# Patient Record
Sex: FEMALE | Race: WHITE | Employment: OTHER | URBAN - METROPOLITAN AREA
[De-identification: names, ages, dates, MRNs, and addresses within clinical notes are randomized per-mention and may not be internally consistent; named-entity substitution may affect disease eponyms.]

---

## 2017-01-26 ENCOUNTER — OFFICE VISIT (OUTPATIENT)
Dept: CARDIOLOGY CLINIC | Age: 77
End: 2017-01-26

## 2017-01-26 VITALS
BODY MASS INDEX: 23.84 KG/M2 | HEIGHT: 60 IN | DIASTOLIC BLOOD PRESSURE: 80 MMHG | HEART RATE: 62 BPM | SYSTOLIC BLOOD PRESSURE: 132 MMHG | WEIGHT: 121.4 LBS

## 2017-01-26 DIAGNOSIS — K21.9 GASTROESOPHAGEAL REFLUX DISEASE WITHOUT ESOPHAGITIS: ICD-10-CM

## 2017-01-26 DIAGNOSIS — R55 SYNCOPE, UNSPECIFIED SYNCOPE TYPE: Primary | ICD-10-CM

## 2017-01-26 DIAGNOSIS — K44.9 HERNIA, HIATAL: ICD-10-CM

## 2017-01-26 DIAGNOSIS — I44.7 LBBB (LEFT BUNDLE BRANCH BLOCK): ICD-10-CM

## 2017-01-26 DIAGNOSIS — Z82.49 FAMILY HISTORY OF CORONARY ARTERY DISEASE: ICD-10-CM

## 2017-01-26 DIAGNOSIS — I10 ESSENTIAL HYPERTENSION: ICD-10-CM

## 2017-01-26 DIAGNOSIS — I38 VHD (VALVULAR HEART DISEASE): ICD-10-CM

## 2017-01-26 PROCEDURE — G8400 PT W/DXA NO RESULTS DOC: HCPCS | Performed by: INTERNAL MEDICINE

## 2017-01-26 PROCEDURE — 1036F TOBACCO NON-USER: CPT | Performed by: INTERNAL MEDICINE

## 2017-01-26 PROCEDURE — 99214 OFFICE O/P EST MOD 30 MIN: CPT | Performed by: INTERNAL MEDICINE

## 2017-01-26 PROCEDURE — G8484 FLU IMMUNIZE NO ADMIN: HCPCS | Performed by: INTERNAL MEDICINE

## 2017-01-26 PROCEDURE — 1090F PRES/ABSN URINE INCON ASSESS: CPT | Performed by: INTERNAL MEDICINE

## 2017-01-26 PROCEDURE — 4040F PNEUMOC VAC/ADMIN/RCVD: CPT | Performed by: INTERNAL MEDICINE

## 2017-01-26 PROCEDURE — G8420 CALC BMI NORM PARAMETERS: HCPCS | Performed by: INTERNAL MEDICINE

## 2017-01-26 PROCEDURE — G8427 DOCREV CUR MEDS BY ELIG CLIN: HCPCS | Performed by: INTERNAL MEDICINE

## 2017-01-26 PROCEDURE — 1123F ACP DISCUSS/DSCN MKR DOCD: CPT | Performed by: INTERNAL MEDICINE

## 2017-02-09 ENCOUNTER — NURSE ONLY (OUTPATIENT)
Dept: CARDIOLOGY CLINIC | Age: 77
End: 2017-02-09

## 2017-02-09 DIAGNOSIS — K44.9 HERNIA, HIATAL: ICD-10-CM

## 2017-02-09 DIAGNOSIS — R55 SYNCOPE, UNSPECIFIED SYNCOPE TYPE: Primary | ICD-10-CM

## 2017-02-09 DIAGNOSIS — I38 VHD (VALVULAR HEART DISEASE): ICD-10-CM

## 2017-02-09 DIAGNOSIS — I44.7 LBBB (LEFT BUNDLE BRANCH BLOCK): ICD-10-CM

## 2017-02-09 DIAGNOSIS — K21.9 GASTROESOPHAGEAL REFLUX DISEASE WITHOUT ESOPHAGITIS: ICD-10-CM

## 2017-02-09 DIAGNOSIS — I10 ESSENTIAL HYPERTENSION: ICD-10-CM

## 2017-02-09 DIAGNOSIS — Z82.49 FAMILY HISTORY OF CORONARY ARTERY DISEASE: ICD-10-CM

## 2017-02-09 PROCEDURE — 93225 XTRNL ECG REC<48 HRS REC: CPT | Performed by: INTERNAL MEDICINE

## 2017-02-22 PROCEDURE — 93227 XTRNL ECG REC<48 HR R&I: CPT | Performed by: INTERNAL MEDICINE

## 2017-02-23 ENCOUNTER — OFFICE VISIT (OUTPATIENT)
Dept: CARDIOLOGY CLINIC | Age: 77
End: 2017-02-23

## 2017-02-23 VITALS
OXYGEN SATURATION: 95 % | SYSTOLIC BLOOD PRESSURE: 140 MMHG | BODY MASS INDEX: 23.95 KG/M2 | HEART RATE: 58 BPM | WEIGHT: 122 LBS | DIASTOLIC BLOOD PRESSURE: 60 MMHG | HEIGHT: 60 IN

## 2017-02-23 DIAGNOSIS — K21.9 GASTROESOPHAGEAL REFLUX DISEASE WITHOUT ESOPHAGITIS: ICD-10-CM

## 2017-02-23 DIAGNOSIS — Z82.49 FAMILY HISTORY OF CORONARY ARTERY DISEASE: ICD-10-CM

## 2017-02-23 DIAGNOSIS — I44.7 LBBB (LEFT BUNDLE BRANCH BLOCK): ICD-10-CM

## 2017-02-23 DIAGNOSIS — I10 ESSENTIAL HYPERTENSION: Primary | ICD-10-CM

## 2017-02-23 DIAGNOSIS — I38 VHD (VALVULAR HEART DISEASE): ICD-10-CM

## 2017-02-23 DIAGNOSIS — R55 SYNCOPE, UNSPECIFIED SYNCOPE TYPE: ICD-10-CM

## 2017-02-23 PROCEDURE — G8427 DOCREV CUR MEDS BY ELIG CLIN: HCPCS | Performed by: INTERNAL MEDICINE

## 2017-02-23 PROCEDURE — 99213 OFFICE O/P EST LOW 20 MIN: CPT | Performed by: INTERNAL MEDICINE

## 2017-02-23 PROCEDURE — G8400 PT W/DXA NO RESULTS DOC: HCPCS | Performed by: INTERNAL MEDICINE

## 2017-02-23 PROCEDURE — 4040F PNEUMOC VAC/ADMIN/RCVD: CPT | Performed by: INTERNAL MEDICINE

## 2017-02-23 PROCEDURE — 1123F ACP DISCUSS/DSCN MKR DOCD: CPT | Performed by: INTERNAL MEDICINE

## 2017-02-23 PROCEDURE — 1036F TOBACCO NON-USER: CPT | Performed by: INTERNAL MEDICINE

## 2017-02-23 PROCEDURE — G8420 CALC BMI NORM PARAMETERS: HCPCS | Performed by: INTERNAL MEDICINE

## 2017-02-23 PROCEDURE — G8484 FLU IMMUNIZE NO ADMIN: HCPCS | Performed by: INTERNAL MEDICINE

## 2017-02-23 PROCEDURE — 1090F PRES/ABSN URINE INCON ASSESS: CPT | Performed by: INTERNAL MEDICINE

## 2017-03-16 ENCOUNTER — HOSPITAL ENCOUNTER (OUTPATIENT)
Dept: GENERAL RADIOLOGY | Age: 77
Discharge: HOME | End: 2017-03-16
Attending: GENERAL PRACTICE | Admitting: GENERAL PRACTICE

## 2017-03-16 DIAGNOSIS — R05.9 COUGH: ICD-10-CM

## 2017-03-30 ENCOUNTER — HOSPITAL ENCOUNTER (OUTPATIENT)
Dept: INFUSION THERAPY | Age: 77
Discharge: HOME | End: 2017-03-30
Attending: GENERAL PRACTICE | Admitting: GENERAL PRACTICE

## 2017-03-30 VITALS
RESPIRATION RATE: 14 BRPM | HEART RATE: 58 BPM | OXYGEN SATURATION: 98 % | DIASTOLIC BLOOD PRESSURE: 66 MMHG | TEMPERATURE: 97.9 F | SYSTOLIC BLOOD PRESSURE: 135 MMHG

## 2017-04-10 ENCOUNTER — HOSPITAL ENCOUNTER (OUTPATIENT)
Dept: GENERAL RADIOLOGY | Age: 77
Discharge: HOME | End: 2017-04-10
Attending: GENERAL PRACTICE | Admitting: GENERAL PRACTICE

## 2017-04-10 DIAGNOSIS — J15.9 BACTERIAL PNEUMONIA: ICD-10-CM

## 2017-04-10 DIAGNOSIS — I10 BENIGN HYPERTENSION: ICD-10-CM

## 2017-06-16 ENCOUNTER — HOSPITAL ENCOUNTER (OUTPATIENT)
Dept: OTHER | Age: 77
Discharge: HOME | End: 2017-06-16
Attending: FAMILY MEDICINE

## 2017-06-18 LAB
CULTURE: NORMAL
REPORT STATUS: NORMAL
REQUEST PROBLEM: NORMAL
SPECIMEN: NORMAL

## 2017-09-28 ENCOUNTER — OFFICE VISIT (OUTPATIENT)
Dept: CARDIOLOGY CLINIC | Age: 77
End: 2017-09-28

## 2017-09-28 VITALS
DIASTOLIC BLOOD PRESSURE: 76 MMHG | WEIGHT: 122.6 LBS | HEART RATE: 62 BPM | HEIGHT: 60 IN | BODY MASS INDEX: 24.07 KG/M2 | SYSTOLIC BLOOD PRESSURE: 120 MMHG

## 2017-09-28 DIAGNOSIS — I38 VHD (VALVULAR HEART DISEASE): ICD-10-CM

## 2017-09-28 DIAGNOSIS — K44.9 HERNIA, HIATAL: ICD-10-CM

## 2017-09-28 DIAGNOSIS — I44.7 LBBB (LEFT BUNDLE BRANCH BLOCK): ICD-10-CM

## 2017-09-28 DIAGNOSIS — Z82.49 FAMILY HISTORY OF CORONARY ARTERY DISEASE: ICD-10-CM

## 2017-09-28 DIAGNOSIS — R55 SYNCOPE, UNSPECIFIED SYNCOPE TYPE: ICD-10-CM

## 2017-09-28 DIAGNOSIS — K21.9 GASTROESOPHAGEAL REFLUX DISEASE WITHOUT ESOPHAGITIS: ICD-10-CM

## 2017-09-28 DIAGNOSIS — I10 ESSENTIAL HYPERTENSION: Primary | ICD-10-CM

## 2017-09-28 PROCEDURE — 1123F ACP DISCUSS/DSCN MKR DOCD: CPT | Performed by: INTERNAL MEDICINE

## 2017-09-28 PROCEDURE — G8420 CALC BMI NORM PARAMETERS: HCPCS | Performed by: INTERNAL MEDICINE

## 2017-09-28 PROCEDURE — 1036F TOBACCO NON-USER: CPT | Performed by: INTERNAL MEDICINE

## 2017-09-28 PROCEDURE — G8400 PT W/DXA NO RESULTS DOC: HCPCS | Performed by: INTERNAL MEDICINE

## 2017-09-28 PROCEDURE — 1090F PRES/ABSN URINE INCON ASSESS: CPT | Performed by: INTERNAL MEDICINE

## 2017-09-28 PROCEDURE — 99213 OFFICE O/P EST LOW 20 MIN: CPT | Performed by: INTERNAL MEDICINE

## 2017-09-28 PROCEDURE — 4040F PNEUMOC VAC/ADMIN/RCVD: CPT | Performed by: INTERNAL MEDICINE

## 2017-09-28 PROCEDURE — G8427 DOCREV CUR MEDS BY ELIG CLIN: HCPCS | Performed by: INTERNAL MEDICINE

## 2018-05-08 PROBLEM — I45.9 HEART BLOCK: Status: ACTIVE | Noted: 2018-05-08

## 2018-05-14 ENCOUNTER — TELEPHONE (OUTPATIENT)
Dept: CARDIOLOGY CLINIC | Age: 78
End: 2018-05-14

## 2018-05-16 ENCOUNTER — OFFICE VISIT (OUTPATIENT)
Dept: CARDIOLOGY CLINIC | Age: 78
End: 2018-05-16

## 2018-05-16 VITALS
HEIGHT: 60 IN | BODY MASS INDEX: 23.32 KG/M2 | HEART RATE: 64 BPM | OXYGEN SATURATION: 96 % | DIASTOLIC BLOOD PRESSURE: 88 MMHG | SYSTOLIC BLOOD PRESSURE: 168 MMHG | WEIGHT: 118.8 LBS

## 2018-05-16 DIAGNOSIS — Z95.0 PACEMAKER: Primary | ICD-10-CM

## 2018-05-16 DIAGNOSIS — I10 ESSENTIAL HYPERTENSION: ICD-10-CM

## 2018-05-16 PROCEDURE — 99024 POSTOP FOLLOW-UP VISIT: CPT | Performed by: INTERNAL MEDICINE

## 2018-05-16 RX ORDER — CANDESARTAN CILEXETIL AND HYDROCHLOROTHIAZIDE 32; 12.5 MG/1; MG/1
1 TABLET ORAL DAILY
Qty: 30 TABLET | Refills: 3 | Status: SHIPPED | OUTPATIENT
Start: 2018-05-16 | End: 2018-06-27

## 2018-05-21 ENCOUNTER — TELEPHONE (OUTPATIENT)
Dept: CARDIOLOGY CLINIC | Age: 78
End: 2018-05-21

## 2018-05-24 ENCOUNTER — NURSE ONLY (OUTPATIENT)
Dept: CARDIOLOGY CLINIC | Age: 78
End: 2018-05-24

## 2018-05-24 VITALS — TEMPERATURE: 98.2 F

## 2018-05-24 VITALS
WEIGHT: 116 LBS | HEIGHT: 60 IN | DIASTOLIC BLOOD PRESSURE: 82 MMHG | HEART RATE: 60 BPM | BODY MASS INDEX: 22.78 KG/M2 | SYSTOLIC BLOOD PRESSURE: 120 MMHG

## 2018-05-24 DIAGNOSIS — Z95.0 STATUS POST PLACEMENT OF CARDIAC PACEMAKER: Primary | ICD-10-CM

## 2018-05-24 PROCEDURE — 99024 POSTOP FOLLOW-UP VISIT: CPT | Performed by: INTERNAL MEDICINE

## 2018-06-08 ENCOUNTER — OFFICE VISIT (OUTPATIENT)
Dept: CARDIOLOGY CLINIC | Age: 78
End: 2018-06-08

## 2018-06-08 VITALS
BODY MASS INDEX: 22.85 KG/M2 | DIASTOLIC BLOOD PRESSURE: 82 MMHG | HEART RATE: 60 BPM | HEIGHT: 60 IN | SYSTOLIC BLOOD PRESSURE: 124 MMHG | WEIGHT: 116.4 LBS

## 2018-06-08 DIAGNOSIS — I44.7 LBBB (LEFT BUNDLE BRANCH BLOCK): ICD-10-CM

## 2018-06-08 DIAGNOSIS — I38 VHD (VALVULAR HEART DISEASE): ICD-10-CM

## 2018-06-08 DIAGNOSIS — R55 SYNCOPE, UNSPECIFIED SYNCOPE TYPE: ICD-10-CM

## 2018-06-08 DIAGNOSIS — I10 ESSENTIAL HYPERTENSION: ICD-10-CM

## 2018-06-08 DIAGNOSIS — I44.2 COMPLETE HEART BLOCK (HCC): Primary | ICD-10-CM

## 2018-06-08 DIAGNOSIS — Z82.49 FAMILY HISTORY OF CORONARY ARTERY DISEASE: ICD-10-CM

## 2018-06-08 DIAGNOSIS — Z95.0 CARDIAC PACEMAKER IN SITU: ICD-10-CM

## 2018-06-08 DIAGNOSIS — K21.9 GASTROESOPHAGEAL REFLUX DISEASE WITHOUT ESOPHAGITIS: ICD-10-CM

## 2018-06-08 DIAGNOSIS — R00.1 BRADYCARDIA: ICD-10-CM

## 2018-06-08 PROCEDURE — G8420 CALC BMI NORM PARAMETERS: HCPCS | Performed by: INTERNAL MEDICINE

## 2018-06-08 PROCEDURE — 93280 PM DEVICE PROGR EVAL DUAL: CPT | Performed by: INTERNAL MEDICINE

## 2018-06-08 PROCEDURE — 4040F PNEUMOC VAC/ADMIN/RCVD: CPT | Performed by: INTERNAL MEDICINE

## 2018-06-08 PROCEDURE — 1036F TOBACCO NON-USER: CPT | Performed by: INTERNAL MEDICINE

## 2018-06-08 PROCEDURE — 1111F DSCHRG MED/CURRENT MED MERGE: CPT | Performed by: INTERNAL MEDICINE

## 2018-06-08 PROCEDURE — G8427 DOCREV CUR MEDS BY ELIG CLIN: HCPCS | Performed by: INTERNAL MEDICINE

## 2018-06-08 PROCEDURE — 1123F ACP DISCUSS/DSCN MKR DOCD: CPT | Performed by: INTERNAL MEDICINE

## 2018-06-08 PROCEDURE — 1090F PRES/ABSN URINE INCON ASSESS: CPT | Performed by: INTERNAL MEDICINE

## 2018-06-08 PROCEDURE — 99213 OFFICE O/P EST LOW 20 MIN: CPT | Performed by: INTERNAL MEDICINE

## 2018-06-08 PROCEDURE — G8400 PT W/DXA NO RESULTS DOC: HCPCS | Performed by: INTERNAL MEDICINE

## 2018-06-08 RX ORDER — ZOLPIDEM TARTRATE 5 MG/1
TABLET ORAL
Refills: 0 | COMMUNITY
Start: 2018-05-25 | End: 2018-11-29

## 2018-06-08 RX ORDER — ATENOLOL 25 MG/1
25 TABLET ORAL DAILY
Qty: 30 TABLET | Refills: 5 | Status: SHIPPED | OUTPATIENT
Start: 2018-06-08 | End: 2018-06-08 | Stop reason: SDUPTHER

## 2018-06-08 RX ORDER — ATENOLOL 25 MG/1
25 TABLET ORAL DAILY
Qty: 90 TABLET | Refills: 3 | Status: SHIPPED | OUTPATIENT
Start: 2018-06-08

## 2018-06-26 ENCOUNTER — TELEPHONE (OUTPATIENT)
Dept: CARDIOLOGY CLINIC | Age: 78
End: 2018-06-26

## 2018-06-27 ENCOUNTER — NURSE ONLY (OUTPATIENT)
Dept: CARDIOLOGY CLINIC | Age: 78
End: 2018-06-27

## 2018-06-27 VITALS
DIASTOLIC BLOOD PRESSURE: 70 MMHG | WEIGHT: 116.2 LBS | SYSTOLIC BLOOD PRESSURE: 120 MMHG | HEIGHT: 60 IN | HEART RATE: 60 BPM | BODY MASS INDEX: 22.81 KG/M2

## 2018-06-27 DIAGNOSIS — K21.9 GASTROESOPHAGEAL REFLUX DISEASE WITHOUT ESOPHAGITIS: ICD-10-CM

## 2018-06-27 DIAGNOSIS — I10 ESSENTIAL HYPERTENSION: ICD-10-CM

## 2018-06-27 DIAGNOSIS — I44.2 COMPLETE HEART BLOCK (HCC): Primary | ICD-10-CM

## 2018-06-27 DIAGNOSIS — R00.1 BRADYCARDIA: ICD-10-CM

## 2018-06-27 DIAGNOSIS — I38 VHD (VALVULAR HEART DISEASE): ICD-10-CM

## 2018-06-27 DIAGNOSIS — Z82.49 FAMILY HISTORY OF CORONARY ARTERY DISEASE: ICD-10-CM

## 2018-06-27 DIAGNOSIS — I44.7 LBBB (LEFT BUNDLE BRANCH BLOCK): ICD-10-CM

## 2018-06-27 DIAGNOSIS — R55 SYNCOPE, UNSPECIFIED SYNCOPE TYPE: ICD-10-CM

## 2018-06-27 PROCEDURE — 99211 OFF/OP EST MAY X REQ PHY/QHP: CPT | Performed by: INTERNAL MEDICINE

## 2018-06-27 RX ORDER — CANDESARTAN 32 MG/1
32 TABLET ORAL DAILY
COMMUNITY

## 2018-09-15 PROCEDURE — 93296 REM INTERROG EVL PM/IDS: CPT | Performed by: INTERNAL MEDICINE

## 2018-09-15 PROCEDURE — 93294 REM INTERROG EVL PM/LDLS PM: CPT | Performed by: INTERNAL MEDICINE

## 2018-09-17 ENCOUNTER — PROCEDURE VISIT (OUTPATIENT)
Dept: CARDIOLOGY CLINIC | Age: 78
End: 2018-09-17

## 2018-09-17 DIAGNOSIS — Z95.0 CARDIAC PACEMAKER IN SITU: Primary | ICD-10-CM

## 2018-09-17 DIAGNOSIS — I45.9 HEART BLOCK: ICD-10-CM

## 2018-11-29 ENCOUNTER — OFFICE VISIT (OUTPATIENT)
Dept: CARDIOLOGY CLINIC | Age: 78
End: 2018-11-29
Payer: MEDICARE

## 2018-11-29 VITALS
SYSTOLIC BLOOD PRESSURE: 118 MMHG | DIASTOLIC BLOOD PRESSURE: 76 MMHG | WEIGHT: 116.6 LBS | HEART RATE: 62 BPM | HEIGHT: 60 IN | BODY MASS INDEX: 22.89 KG/M2

## 2018-11-29 DIAGNOSIS — I10 ESSENTIAL HYPERTENSION: ICD-10-CM

## 2018-11-29 DIAGNOSIS — I38 VHD (VALVULAR HEART DISEASE): Primary | ICD-10-CM

## 2018-11-29 DIAGNOSIS — R55 SYNCOPE, UNSPECIFIED SYNCOPE TYPE: ICD-10-CM

## 2018-11-29 PROCEDURE — 99213 OFFICE O/P EST LOW 20 MIN: CPT | Performed by: NURSE PRACTITIONER

## 2018-11-29 NOTE — PROGRESS NOTES
CARDIOLOGY  NOTE      11/29/2018    RE: Andrew Corrales  (1940)                               TO:  Dr. Edmond Conklin MD  The primary cardiologist is Dr. Lopez Session     CC: VHD HTN  PPM    HPI:  She during this visit has the following concerns:   Chest pain No  SOB No  Palpitations No  Dizziness No  Syncope No    She reports that she is feeling good. She does not that she still does not have her stamina back from having a pacemaker placed. She is active and exercises. Vitals:    11/29/18 0829   BP: 118/76   Pulse: 62       Current Outpatient Prescriptions   Medication Sig Dispense Refill    candesartan (ATACAND) 32 MG tablet Take 32 mg by mouth daily      Acetaminophen (TYLENOL ARTHRITIS PAIN PO) Take by mouth      atenolol (TENORMIN) 25 MG tablet Take 1 tablet by mouth daily 90 tablet 3    Probiotic Product (PROBIOTIC DAILY PO) Take  by mouth daily.  esomeprazole Magnesium (NEXIUM) 40 MG PACK Take 40 mg by mouth daily.  Calcium Carb-Cholecalciferol (CALCIUM 1000 + D PO) Take 1,000 mg by mouth daily.  therapeutic multivitamin-minerals (THERAGRAN-M) tablet Take 1 tablet by mouth daily. No current facility-administered medications for this visit. Allergies: Hydrochlorothiazide; Triamterene; and Vioxx [rofecoxib]  Past Medical History:   Diagnosis Date    Arthritis     Family history of coronary artery disease     H/O echocardiogram 4/13/15    EF 55% Normal chamber sizes Left ventricular hypertrophy with normal LV systolic, but abnormal diastolic. Mild TR and aortic insufficiencies with mild pulm HTN. Mild-mod mitral regurg.  Hernia, hiatal     History of cardiovascular stress test 1/23/12    The post stress myocardial perfusion images show a normal pattern of perfusion in all regions. The post stress left ventricle is normal in size. Theres no scintigraphic evidence of inducible myocardial

## 2018-12-23 PROCEDURE — 93294 REM INTERROG EVL PM/LDLS PM: CPT | Performed by: INTERNAL MEDICINE

## 2018-12-23 PROCEDURE — 93296 REM INTERROG EVL PM/IDS: CPT | Performed by: INTERNAL MEDICINE

## 2018-12-27 ENCOUNTER — PROCEDURE VISIT (OUTPATIENT)
Dept: CARDIOLOGY CLINIC | Age: 78
End: 2018-12-27
Payer: MEDICARE

## 2018-12-27 DIAGNOSIS — Z95.0 CARDIAC PACEMAKER IN SITU: Primary | ICD-10-CM

## 2018-12-27 DIAGNOSIS — I45.9 HEART BLOCK: ICD-10-CM

## 2019-04-01 ENCOUNTER — PROCEDURE VISIT (OUTPATIENT)
Dept: CARDIOLOGY CLINIC | Age: 79
End: 2019-04-01
Payer: MEDICARE

## 2019-04-01 DIAGNOSIS — I45.9 HEART BLOCK: ICD-10-CM

## 2019-04-01 DIAGNOSIS — Z95.0 CARDIAC PACEMAKER IN SITU: Primary | ICD-10-CM

## 2019-04-01 PROCEDURE — 93294 REM INTERROG EVL PM/LDLS PM: CPT | Performed by: INTERNAL MEDICINE

## 2019-04-01 PROCEDURE — 93296 REM INTERROG EVL PM/IDS: CPT | Performed by: INTERNAL MEDICINE

## 2019-05-30 ENCOUNTER — OFFICE VISIT (OUTPATIENT)
Dept: CARDIOLOGY CLINIC | Age: 79
End: 2019-05-30
Payer: MEDICARE

## 2019-05-30 VITALS
BODY MASS INDEX: 21.99 KG/M2 | WEIGHT: 112 LBS | HEIGHT: 60 IN | DIASTOLIC BLOOD PRESSURE: 72 MMHG | HEART RATE: 60 BPM | SYSTOLIC BLOOD PRESSURE: 130 MMHG | RESPIRATION RATE: 16 BRPM

## 2019-05-30 DIAGNOSIS — I44.7 LBBB (LEFT BUNDLE BRANCH BLOCK): ICD-10-CM

## 2019-05-30 DIAGNOSIS — I10 ESSENTIAL HYPERTENSION: Primary | ICD-10-CM

## 2019-05-30 DIAGNOSIS — I38 VHD (VALVULAR HEART DISEASE): ICD-10-CM

## 2019-05-30 DIAGNOSIS — Z82.49 FAMILY HISTORY OF CORONARY ARTERY DISEASE: ICD-10-CM

## 2019-05-30 DIAGNOSIS — Z95.0 PACEMAKER: ICD-10-CM

## 2019-05-30 DIAGNOSIS — R55 SYNCOPE, UNSPECIFIED SYNCOPE TYPE: ICD-10-CM

## 2019-05-30 DIAGNOSIS — R00.1 BRADYCARDIA: ICD-10-CM

## 2019-05-30 DIAGNOSIS — I44.2 COMPLETE HEART BLOCK (HCC): ICD-10-CM

## 2019-05-30 DIAGNOSIS — I45.9 HEART BLOCK: ICD-10-CM

## 2019-05-30 PROCEDURE — G8400 PT W/DXA NO RESULTS DOC: HCPCS | Performed by: INTERNAL MEDICINE

## 2019-05-30 PROCEDURE — G8427 DOCREV CUR MEDS BY ELIG CLIN: HCPCS | Performed by: INTERNAL MEDICINE

## 2019-05-30 PROCEDURE — G8420 CALC BMI NORM PARAMETERS: HCPCS | Performed by: INTERNAL MEDICINE

## 2019-05-30 PROCEDURE — 1036F TOBACCO NON-USER: CPT | Performed by: INTERNAL MEDICINE

## 2019-05-30 PROCEDURE — 1123F ACP DISCUSS/DSCN MKR DOCD: CPT | Performed by: INTERNAL MEDICINE

## 2019-05-30 PROCEDURE — 1090F PRES/ABSN URINE INCON ASSESS: CPT | Performed by: INTERNAL MEDICINE

## 2019-05-30 PROCEDURE — 99213 OFFICE O/P EST LOW 20 MIN: CPT | Performed by: INTERNAL MEDICINE

## 2019-05-30 PROCEDURE — 4040F PNEUMOC VAC/ADMIN/RCVD: CPT | Performed by: INTERNAL MEDICINE

## 2019-05-30 RX ORDER — ZOLPIDEM TARTRATE 5 MG/1
TABLET ORAL
Refills: 2 | COMMUNITY
Start: 2019-03-02 | End: 2020-05-29

## 2019-05-30 NOTE — LETTER
[facility]  [address] [place] [address]  Phone: [phone]  Fax: [phone]    Franck Moulton MD        May 30, 2019     Sonny Chester MD  [place] [id number]    Patient: Radha Barton  MR Number: [id number]  YOB: 1940  Date of Visit: 5/30/2019    Dear Dr. Sonny Chester:    Thank you for the request for consultation for [place] Castles to me for the evaluation of PACEMAKER. Below are the relevant portions of my assessment and plan of care. If you have questions, please do not hesitate to call me. I look forward to following Liliana Echols along with you.     Sincerely,        Franck Moulton MD

## 2019-05-30 NOTE — PROGRESS NOTES
OFFICE Kermitt Hashimoto is a 77 y.o. female who has    CHIEF COMPLAINT AS FOLLOWS:  CHEST PAIN: Patient denies any C/O chest pains at this time. SOB: No C/O SOB at this time. LEG EDEMA: No leg edema   PALPITATIONS: Denies any C/O Palpitations                                   DIZZINESS: No C/O Dizziness                           SYNCOPE: None   OTHER:                                     HPI: Patient is here for F/U on her VHD & CHB  problems. She does not have any complaints at this time. Brent aMrtin has the following history recorded in care path:  Patient Active Problem List    Diagnosis Date Noted    Bradycardia      Priority: High    Complete heart block ([facility] [address]      Priority: High    Pacemaker 05/30/2019    Heart block 05/08/2018    Syncope 01/26/2017    VHD (valvular heart disease) 06/30/2016    H/O echocardiogram 04/13/2015    H/O echocardiogram 04/13/2015    Family history of coronary artery disease     GERD (gastroesophageal reflux disease) 06/30/2014    Hypertension     LBBB (left bundle branch block)     Hernia, hiatal      Current Outpatient Medications   Medication Sig Dispense Refill    zolpidem (AMBIEN) 5 MG tablet TAKE 1-2 TABLETS BY MOUTH EVERY NIGHT AT BEDTIME  2    candesartan (ATACAND) 32 MG tablet Take 32 mg by mouth daily      atenolol (TENORMIN) 25 MG tablet Take 1 tablet by mouth daily 90 tablet 3    Probiotic Product (PROBIOTIC DAILY PO) Take  by mouth daily.  esomeprazole Magnesium (NEXIUM) 40 MG PACK Take 40 mg by mouth daily.  Calcium Carb-Cholecalciferol (CALCIUM 1000 + D PO) Take 1,000 mg by mouth daily.  therapeutic multivitamin-minerals (THERAGRAN-M) tablet Take 1 tablet by mouth daily. No current facility-administered medications for this visit.       Allergies: Hydrochlorothiazide; Triamterene; and Vioxx [rofecoxib]  Past Medical History:   Diagnosis Date    Arthritis     Family history of coronary artery disease     H/O echocardiogram 4/13/15    EF 55% Normal chamber sizes Left ventricular hypertrophy with normal LV systolic, but abnormal diastolic. Mild TR and aortic insufficiencies with mild pulm HTN. Mild-mod mitral regurg.  Hernia, hiatal     History of cardiovascular stress test 1/23/12    The post stress myocardial perfusion images show a normal pattern of perfusion in all regions. The post stress left ventricle is normal in size. Theres no scintigraphic evidence of inducible myocardial ischemia. No wall motion abnormalities seen. No previous study to compare with. The EF is 70%. Normal myocardial perfusion study.  History of complete electrocardiogram 1/23/12    History of echocardiogram 1/24/12    Theres mild asymmetric left ventricular hypertrophy. Left ventricular systolic function is normal. EF is >55%. Theres mild mitral regurg. noted. Theres mild tricuspid regurg. noted. Mild aortic regurg. noted.  History of Holter monitoring 02/09/2017    Sinus rhythm 48 hr holter     History of nuclear stress test 4/13/15    EF 70%.  WNL    Hypertension     IBS (irritable bowel syndrome)     LBBB (left bundle branch block)      Past Surgical History:   Procedure Laterality Date    APPENDECTOMY  1952    BLADDER SURGERY  3/09    BREAST BIOPSY  1972 & 1984    HYSTERECTOMY  1982    KNEE SURGERY  2005    Left Knee    PACEMAKER INSERTION Left 05/09/2018    [employer] XT DR MRI [facility] PPM    SINUS SURGERY  1985      As reviewed   Family History   Problem Relation Age of Onset    Stroke Mother         CVA    Stroke Brother         CVA    Heart Attack Brother     Diabetes Daughter      Social History     Tobacco Use    Smoking status: Never Smoker    Smokeless tobacco: Never Used   Substance Use Topics    Alcohol use: Yes     Comment: Rarely      Review of Systems:    Constitutional: Negative for diaphoresis and fatigue  Psychological:Negative for anxiety or depression  HENT: Negative for headaches, nasal congestion, sinus pain or vertigo  Eyes: Negative for visual disturbance. Endocrine: Negative for polydipsia/polyuria  Respiratory: Negative for shortness of breath  Cardiovascular: Negative for chest pain, dyspnea on exertion, claudication, edema, irregular heartbeat, murmur, palpitations or shortness of breath  Gastrointestinal: Negative for abdominal pain or heartburn  Genito-Urinary: Negative for urinary frequency/urgency  Musculoskeletal: Negative for muscle pain, muscular weakness, negative for pain in arm and leg or swelling in foot and leg  Neurological: Negative for dizziness, headaches, memory loss, numbness/tingling, visual changes, syncope  Dermatological: Negative for rash    Objective:  /72   Pulse 60   Resp 16   Ht 5' (1.524 m)   Wt 112 lb (50.8 kg)   BMI 21.87 kg/m²   Wt Readings from Last 3 Encounters:   05/30/19 112 lb (50.8 kg)   11/29/18 116 lb 9.6 oz (52.9 kg)   06/27/18 116 lb 3.2 oz (52.7 kg)     Body mass index is 21.87 kg/m². GENERAL - Alert, oriented, pleasant, in no apparent distress. EYES: No jaundice, no conjunctival pallor. SKIN: It is warm & dry. No rashes. No Echhymosis    HEENT - No clinically significant abnormalities seen. Neck - Supple. No jugular venous distention noted. No carotid bruits. Cardiovascular - Normal S1 and S2 with 2/6 EDM. Extremities - No cyanosis, clubbing, or significant edema. Pulmonary - No respiratory distress. No wheezes or rales. Abdomen - No masses, tenderness, or organomegaly. Musculoskeletal - No significant edema. No joint deformities. No muscle wasting. Neurologic - Cranial nerves II through XII are grossly intact. There were no gross focal neurologic abnormalities.     Lab Review   Lab Results   Component Value Date    TROPONINT <0.010 05/09/2018    TROPONINT <0.010 05/08/2018     BNP:  No results found for: BNP  PT/INR:    Lab Results   Component Value Date    INR 1.10 05/09/2018     No results found for: LABA1C  Lab Results   Component Value Date    WBC 11.7 (H) 05/09/2018    WBC 9.7 05/08/2018    HCT 37.0 05/09/2018    HCT 40.6 05/08/2018    MCV 97.6 05/09/2018    .2 (H) 05/08/2018     05/09/2018     05/08/2018     No results found for: CHOL, TRIG, HDL, LDLCALC, LDLDIRECT, CHOLHDLRATIO  Lab Results   Component Value Date    ALT 28 05/08/2018    ALT 11 01/20/2017    AST 44 (H) 05/08/2018    AST 18 01/20/2017     BMP:    Lab Results   Component Value Date     05/09/2018     05/08/2018    K 4.3 05/09/2018    K 4.0 05/08/2018     05/09/2018     05/08/2018    CO2 22 05/09/2018    CO2 18 05/08/2018    BUN 10 05/09/2018    BUN 14 05/08/2018    CREATININE 0.7 05/09/2018    CREATININE 0.8 05/08/2018     CMP:   Lab Results   Component Value Date     05/09/2018     05/08/2018    K 4.3 05/09/2018    K 4.0 05/08/2018     05/09/2018     05/08/2018    CO2 22 05/09/2018    CO2 18 05/08/2018    BUN 10 05/09/2018    BUN 14 05/08/2018    PROT 7.5 05/08/2018    PROT 7.8 01/20/2017     TSH:    Lab Results   Component Value Date    TSHHS 3.450 05/08/2018       QUALITY MEASURES REVIEWED:  1.CAD:Patient is taking anti platelet agent:No  2. DYSLIPIDEMIA: Patient is on cholesterol lowering medication:No  3. Beta-Blocker therapy for CAD, if prior Myocardial Infarction:Yes  4. Atrial fibrillation & anticoagulation therapy No    Impression:    1. Essential hypertension    2. Bradycardia    3. Complete heart block ([address]    4. LBBB (left bundle branch block)    5. Family history of coronary artery disease    6. VHD (valvular heart disease)    7. Syncope, unspecified syncope type    8. Heart block    9.  Pacemaker       Patient Active Problem List   Diagnosis Code    Hypertension I10    LBBB (left bundle branch block) I44.7    Hernia, hiatal K44.9    GERD (gastroesophageal reflux disease) K21.9    Family history of coronary artery disease Z80.55    H/O echocardiogram Z92.89    H/O echocardiogram Z92.89    VHD (valvular heart disease) I38    Syncope R55    Bradycardia R00.1    Complete heart block (HCC) I44.2    Heart block I45.9    Pacemaker Z95.0       Assessment & Plan:    CAD:No  HTN:well controlled   CARDIOMYOPATHY: None known   CONGESTIVE HEART FAILURE: NO KNOWN HISTORY.    VHD: No significant VHD noted. Mild AI  OTHER RELEVANT DIAGNOSIS:as noted in patient's active problem list:  TESTS ORDERED: None this visit          Echocardiogram, Lexiscan Cardiolite, Carotid US, Peripheral LE US & Lipid profile. All previously ordered tests reviewed. ARRHYTHMIAS: None known                                Patient has H/O Atrial fibrillation                                He is rate controlled & on anticoagulation. Patient is in NSR with the help of anti arrhythmics. MEDICATIONS: CPM   Office f/u in six months. Device check per protocol. Primary/secondary prevention is the goal by aggressive risk modification, healthy and therapeutic life style changes for cardiovascular risk reduction. Various goals are discussed and multiple questions answered.

## 2019-07-08 ENCOUNTER — PROCEDURE VISIT (OUTPATIENT)
Dept: CARDIOLOGY CLINIC | Age: 79
End: 2019-07-08
Payer: MEDICARE

## 2019-07-08 DIAGNOSIS — I45.9 HEART BLOCK: ICD-10-CM

## 2019-07-08 DIAGNOSIS — Z95.0 CARDIAC PACEMAKER IN SITU: Primary | ICD-10-CM

## 2019-07-08 PROCEDURE — 93296 REM INTERROG EVL PM/IDS: CPT | Performed by: INTERNAL MEDICINE

## 2019-07-08 PROCEDURE — 93294 REM INTERROG EVL PM/LDLS PM: CPT | Performed by: INTERNAL MEDICINE

## 2019-10-15 ENCOUNTER — TELEPHONE (OUTPATIENT)
Dept: CARDIOLOGY CLINIC | Age: 79
End: 2019-10-15

## 2019-10-15 ENCOUNTER — PROCEDURE VISIT (OUTPATIENT)
Dept: CARDIOLOGY CLINIC | Age: 79
End: 2019-10-15
Payer: MEDICARE

## 2019-10-15 DIAGNOSIS — Z95.0 CARDIAC PACEMAKER IN SITU: Primary | ICD-10-CM

## 2019-10-15 DIAGNOSIS — I45.9 HEART BLOCK: ICD-10-CM

## 2019-10-15 PROCEDURE — 93296 REM INTERROG EVL PM/IDS: CPT | Performed by: INTERNAL MEDICINE

## 2019-10-15 PROCEDURE — 93294 REM INTERROG EVL PM/LDLS PM: CPT | Performed by: INTERNAL MEDICINE

## 2019-11-22 ENCOUNTER — OFFICE VISIT (OUTPATIENT)
Dept: CARDIOLOGY CLINIC | Age: 79
End: 2019-11-22
Payer: MEDICARE

## 2019-11-22 VITALS
SYSTOLIC BLOOD PRESSURE: 118 MMHG | HEIGHT: 60 IN | DIASTOLIC BLOOD PRESSURE: 70 MMHG | HEART RATE: 62 BPM | WEIGHT: 115.2 LBS | BODY MASS INDEX: 22.62 KG/M2

## 2019-11-22 DIAGNOSIS — Z95.0 PACEMAKER: ICD-10-CM

## 2019-11-22 DIAGNOSIS — I10 ESSENTIAL HYPERTENSION: Primary | ICD-10-CM

## 2019-11-22 PROCEDURE — G8420 CALC BMI NORM PARAMETERS: HCPCS | Performed by: NURSE PRACTITIONER

## 2019-11-22 PROCEDURE — G8484 FLU IMMUNIZE NO ADMIN: HCPCS | Performed by: NURSE PRACTITIONER

## 2019-11-22 PROCEDURE — 1090F PRES/ABSN URINE INCON ASSESS: CPT | Performed by: NURSE PRACTITIONER

## 2019-11-22 PROCEDURE — G8400 PT W/DXA NO RESULTS DOC: HCPCS | Performed by: NURSE PRACTITIONER

## 2019-11-22 PROCEDURE — 99213 OFFICE O/P EST LOW 20 MIN: CPT | Performed by: NURSE PRACTITIONER

## 2019-11-22 PROCEDURE — 1123F ACP DISCUSS/DSCN MKR DOCD: CPT | Performed by: NURSE PRACTITIONER

## 2019-11-22 PROCEDURE — 4040F PNEUMOC VAC/ADMIN/RCVD: CPT | Performed by: NURSE PRACTITIONER

## 2019-11-22 PROCEDURE — G8427 DOCREV CUR MEDS BY ELIG CLIN: HCPCS | Performed by: NURSE PRACTITIONER

## 2019-11-22 PROCEDURE — 1036F TOBACCO NON-USER: CPT | Performed by: NURSE PRACTITIONER

## 2020-01-23 ENCOUNTER — PROCEDURE VISIT (OUTPATIENT)
Dept: CARDIOLOGY CLINIC | Age: 80
End: 2020-01-23
Payer: MEDICARE

## 2020-01-23 PROCEDURE — 93296 REM INTERROG EVL PM/IDS: CPT | Performed by: INTERNAL MEDICINE

## 2020-01-23 PROCEDURE — 93294 REM INTERROG EVL PM/LDLS PM: CPT | Performed by: INTERNAL MEDICINE

## 2020-03-03 ENCOUNTER — OFFICE VISIT (OUTPATIENT)
Dept: CARDIOLOGY CLINIC | Age: 80
End: 2020-03-03
Payer: MEDICARE

## 2020-03-03 VITALS
DIASTOLIC BLOOD PRESSURE: 80 MMHG | WEIGHT: 115.4 LBS | SYSTOLIC BLOOD PRESSURE: 130 MMHG | BODY MASS INDEX: 22.65 KG/M2 | HEART RATE: 74 BPM | HEIGHT: 60 IN

## 2020-03-03 PROBLEM — R07.2 PRECORDIAL PAIN: Status: ACTIVE | Noted: 2020-03-03

## 2020-03-03 PROCEDURE — 99213 OFFICE O/P EST LOW 20 MIN: CPT | Performed by: INTERNAL MEDICINE

## 2020-03-03 PROCEDURE — G8400 PT W/DXA NO RESULTS DOC: HCPCS | Performed by: INTERNAL MEDICINE

## 2020-03-03 PROCEDURE — 4040F PNEUMOC VAC/ADMIN/RCVD: CPT | Performed by: INTERNAL MEDICINE

## 2020-03-03 PROCEDURE — 1036F TOBACCO NON-USER: CPT | Performed by: INTERNAL MEDICINE

## 2020-03-03 PROCEDURE — G8484 FLU IMMUNIZE NO ADMIN: HCPCS | Performed by: INTERNAL MEDICINE

## 2020-03-03 PROCEDURE — G8427 DOCREV CUR MEDS BY ELIG CLIN: HCPCS | Performed by: INTERNAL MEDICINE

## 2020-03-03 PROCEDURE — 1123F ACP DISCUSS/DSCN MKR DOCD: CPT | Performed by: INTERNAL MEDICINE

## 2020-03-03 PROCEDURE — 1090F PRES/ABSN URINE INCON ASSESS: CPT | Performed by: INTERNAL MEDICINE

## 2020-03-03 PROCEDURE — G8420 CALC BMI NORM PARAMETERS: HCPCS | Performed by: INTERNAL MEDICINE

## 2020-03-03 PROCEDURE — 93000 ELECTROCARDIOGRAM COMPLETE: CPT | Performed by: INTERNAL MEDICINE

## 2020-03-03 NOTE — PROGRESS NOTES
Daughter      Social History     Tobacco Use    Smoking status: Never Smoker    Smokeless tobacco: Never Used   Substance Use Topics    Alcohol use: Yes     Comment: Rarely      Review of Systems:    Constitutional: Negative for diaphoresis and fatigue  Psychological:Negative for anxiety or depression  HENT: Negative for headaches, nasal congestion, sinus pain or vertigo  Eyes: Negative for visual disturbance. Endocrine: Negative for polydipsia/polyuria  Respiratory: Negative for shortness of breath  Cardiovascular: Negative for chest pain, dyspnea on exertion, claudication, edema, irregular heartbeat, murmur, palpitations or shortness of breath  Gastrointestinal: Negative for abdominal pain or heartburn  Genito-Urinary: Negative for urinary frequency/urgency  Musculoskeletal: Negative for muscle pain, muscular weakness, negative for pain in arm and leg or swelling in foot and leg  Neurological: Negative for dizziness, headaches, memory loss, numbness/tingling, visual changes, syncope  Dermatological: Negative for rash    Objective:  /80   Pulse 74   Ht 5' (1.524 m)   Wt 115 lb 6.4 oz (52.3 kg)   BMI 22.54 kg/m²   Wt Readings from Last 3 Encounters:   03/03/20 115 lb 6.4 oz (52.3 kg)   11/22/19 115 lb 3.2 oz (52.3 kg)   05/30/19 112 lb (50.8 kg)     Body mass index is 22.54 kg/m². GENERAL - Alert, oriented, pleasant, in no apparent distress. EYES: No jaundice, no conjunctival pallor. SKIN: It is warm & dry. No rashes. No Echhymosis    HEENT - No clinically significant abnormalities seen. Neck - Supple. No jugular venous distention noted. No carotid bruits. Cardiovascular - Normal S1 and S2 without obvious murmur or gallop. Extremities - No cyanosis, clubbing, or significant edema. Pulmonary - No respiratory distress. No wheezes or rales. Abdomen - No masses, tenderness, or organomegaly. Musculoskeletal - No significant edema. No joint deformities. No muscle wasting.   Neurologic - esophagitis    7. Family history of coronary artery disease    8. VHD (valvular heart disease)    9. Pacemaker    10. Precordial pain    11. Syncope, unspecified syncope type       Patient Active Problem List   Diagnosis Code    Hypertension I10    LBBB (left bundle branch block) I44.7    Hernia, hiatal K44.9    GERD (gastroesophageal reflux disease) K21.9    Family history of coronary artery disease Z80.55    H/O echocardiogram Z92.89    H/O echocardiogram Z92.89    VHD (valvular heart disease) I38    Syncope R55    Bradycardia R00.1    Complete heart block (HCC) I44.2    Heart block I45.9    Pacemaker Z95.0    Precordial pain R07.2       Assessment & Plan:    CAD:None known but patient is C/O chest pain. Details as describe above. HTN:well controlled on current medical regimen, see list above. - changes in  treatment:   no   CARDIOMYOPATHY: None known   CONGESTIVE HEART FAILURE: NO KNOWN HISTORY.     VHD: No significant VHD noted  DYSLIPIDEMIA: Patient's profile is not available. OTHER RELEVANT DIAGNOSIS:as noted in patient's active problem list:  TESTS ORDERED:  Nicole Myers. All previously ordered tests reviewed. ARRHYTHMIAS:  Known H/O CHB S/P PPM   MEDICATIONS: CPM   Office f/u in six months, if stress test is OK. Device check per protocol. Primary/secondary prevention is the goal by aggressive risk modification, healthy and therapeutic life style changes for cardiovascular risk reduction. Various goals are discussed and multiple questions answered.

## 2020-03-03 NOTE — PATIENT INSTRUCTIONS
CAD:None known but patient is C/O chest pain. Details as describe above. HTN:well controlled on current medical regimen, see list above. - changes in  treatment:   no   CARDIOMYOPATHY: None known   CONGESTIVE HEART FAILURE: NO KNOWN HISTORY.     VHD: No significant VHD noted  DYSLIPIDEMIA: Patient's profile is not available. OTHER RELEVANT DIAGNOSIS:as noted in patient's active problem list:  TESTS ORDERED:  Skip Brightly. All previously ordered tests reviewed. ARRHYTHMIAS:  Known H/O CHB S/P PPM   MEDICATIONS: CPM   Office f/u in six months, if stress test is OK. Device check per protocol. Primary/secondary prevention is the goal by aggressive risk modification, healthy and therapeutic life style changes for cardiovascular risk reduction. Various goals are discussed and multiple questions answered.

## 2020-03-04 ENCOUNTER — PROCEDURE VISIT (OUTPATIENT)
Dept: CARDIOLOGY CLINIC | Age: 80
End: 2020-03-04
Payer: MEDICARE

## 2020-03-04 LAB
LV EF: 70 %
LVEF MODALITY: NORMAL

## 2020-03-04 PROCEDURE — 93018 CV STRESS TEST I&R ONLY: CPT | Performed by: INTERNAL MEDICINE

## 2020-03-04 PROCEDURE — 93017 CV STRESS TEST TRACING ONLY: CPT | Performed by: INTERNAL MEDICINE

## 2020-03-04 PROCEDURE — 93016 CV STRESS TEST SUPVJ ONLY: CPT | Performed by: INTERNAL MEDICINE

## 2020-03-04 PROCEDURE — A9500 TC99M SESTAMIBI: HCPCS | Performed by: INTERNAL MEDICINE

## 2020-03-04 PROCEDURE — 78452 HT MUSCLE IMAGE SPECT MULT: CPT | Performed by: INTERNAL MEDICINE

## 2020-03-05 ENCOUNTER — TELEPHONE (OUTPATIENT)
Dept: CARDIOLOGY CLINIC | Age: 80
End: 2020-03-05

## 2020-04-30 ENCOUNTER — PROCEDURE VISIT (OUTPATIENT)
Dept: CARDIOLOGY CLINIC | Age: 80
End: 2020-04-30
Payer: MEDICARE

## 2020-04-30 PROCEDURE — 93296 REM INTERROG EVL PM/IDS: CPT | Performed by: INTERNAL MEDICINE

## 2020-04-30 PROCEDURE — 93294 REM INTERROG EVL PM/LDLS PM: CPT | Performed by: INTERNAL MEDICINE

## 2020-05-29 ENCOUNTER — VIRTUAL VISIT (OUTPATIENT)
Dept: CARDIOLOGY CLINIC | Age: 80
End: 2020-05-29
Payer: MEDICARE

## 2020-05-29 VITALS
HEIGHT: 60 IN | BODY MASS INDEX: 21.6 KG/M2 | DIASTOLIC BLOOD PRESSURE: 74 MMHG | HEART RATE: 72 BPM | WEIGHT: 110 LBS | SYSTOLIC BLOOD PRESSURE: 153 MMHG

## 2020-05-29 PROCEDURE — 4040F PNEUMOC VAC/ADMIN/RCVD: CPT | Performed by: INTERNAL MEDICINE

## 2020-05-29 PROCEDURE — 1090F PRES/ABSN URINE INCON ASSESS: CPT | Performed by: INTERNAL MEDICINE

## 2020-05-29 PROCEDURE — 99213 OFFICE O/P EST LOW 20 MIN: CPT | Performed by: INTERNAL MEDICINE

## 2020-05-29 PROCEDURE — 1123F ACP DISCUSS/DSCN MKR DOCD: CPT | Performed by: INTERNAL MEDICINE

## 2020-05-29 PROCEDURE — G8427 DOCREV CUR MEDS BY ELIG CLIN: HCPCS | Performed by: INTERNAL MEDICINE

## 2020-05-29 PROCEDURE — G8400 PT W/DXA NO RESULTS DOC: HCPCS | Performed by: INTERNAL MEDICINE

## 2020-05-29 NOTE — PATIENT INSTRUCTIONS
CAD:None known . HTN:well controlled on current medical regimen, see list above. - changes in  treatment:   no   CARDIOMYOPATHY: None known   CONGESTIVE HEART FAILURE: NO KNOWN HISTORY.     VHD: No significant VHD noted  DYSLIPIDEMIA: Patient's profile is not available. OTHER RELEVANT DIAGNOSIS:as noted in patient's active problem list:  TESTS ORDERED:  None this visit                                      All previously ordered tests reviewed. ARRHYTHMIAS:  Known H/O CHB S/P PPM   MEDICATIONS: CPM   Office f/u in six months. Device check per protocol. Primary/secondary prevention is the goal by aggressive risk modification, healthy and therapeutic life style changes for cardiovascular risk reduction. Various goals are discussed and multiple questions answered.

## 2020-05-29 NOTE — PROGRESS NOTES
for diaphoresis and fatigue  Psychological:Negative for anxiety or depression  HENT: Negative for headaches, nasal congestion, sinus pain or vertigo  Eyes: Negative for visual disturbance. Endocrine: Negative for polydipsia/polyuria  Respiratory: Negative for shortness of breath  Cardiovascular: Negative for chest pain, dyspnea on exertion, claudication, edema, irregular heartbeat, murmur, palpitations or shortness of breath  Gastrointestinal: Negative for abdominal pain or heartburn  Genito-Urinary: Negative for urinary frequency/urgency  Musculoskeletal: Negative for muscle pain, muscular weakness, negative for pain in arm and leg or swelling in foot and leg  Neurological: Negative for dizziness, headaches, memory loss, numbness/tingling, visual changes, syncope  Dermatological: Negative for rash    Objective:  BP (!) 153/74   Pulse 72   Ht 5' (1.524 m)   Wt 110 lb (49.9 kg)   BMI 21.48 kg/m²   Wt Readings from Last 3 Encounters:   05/29/20 110 lb (49.9 kg)   03/03/20 115 lb 6.4 oz (52.3 kg)   11/22/19 115 lb 3.2 oz (52.3 kg)     Body mass index is 21.48 kg/m². GENERAL - Alert, oriented, pleasant, in no apparent distress. EYES: No jaundice, no conjunctival pallor. SKIN:  No rashes. No Echhymosis    HEENT - No clinically significant abnormalities seen. Neck - Supple. Cardiovascular -     Extremities - No cyanosis, clubbing, or significant edema. Pulmonary - No respiratory distress. .    Abdomen -   Musculoskeletal - No significant edema. No joint deformities. No muscle wasting. Neurologic -  There were no gross focal neurologic abnormalities.     Lab Review   Lab Results   Component Value Date    TROPONINT <0.010 05/09/2018    TROPONINT <0.010 05/08/2018     BNP:  No results found for: BNP  PT/INR:    Lab Results   Component Value Date    INR 1.10 05/09/2018     No results found for: LABA1C  Lab Results   Component Value Date    WBC 11.7 (H) 05/09/2018    WBC 9.7 05/08/2018    HCT 37.0 05/09/2018  Heart block I45.9    Pacemaker Z95.0    Precordial pain R07.2       Assessment & Plan:    being evaluated by a Telephone visit encounter to address concerns as mentioned above. A caregiver was present when appropriate. Due to this being a TeleHealth encounter (During NNTZK-31 public health emergency), evaluation of the following organ systems was limited: Vitals/Constitutional/EENT/Resp/CV/GI//MS/Neuro/Skin/Heme-Lymph-Imm. Pursuant to the emergency declaration under the [facility], [address] [place] [address] authority and the [employer] Act, this Virtual Visit was conducted with patient's (and/or legal guardian's) consent, to reduce the patient's risk of exposure to COVID-19 and provide necessary medical care. The patient (and/or legal guardian) has also been advised to contact this office for worsening conditions or problems, and seek emergency medical treatment and/or call 911 if deemed necessary. Time spent during this visit 15 min.     CAD:None known . HTN:well controlled on current medical regimen, see list above. - changes in  treatment:   no   CARDIOMYOPATHY: None known   CONGESTIVE HEART FAILURE: NO KNOWN HISTORY.     VHD: No significant VHD noted  DYSLIPIDEMIA: Patient's profile is not available. OTHER RELEVANT DIAGNOSIS:as noted in patient's active problem list:  TESTS ORDERED:  None this visit                                      All previously ordered tests reviewed. ARRHYTHMIAS:  Known H/O CHB S/P PPM   MEDICATIONS: CPM   Office f/u in six months. Device check per protocol. Primary/secondary prevention is the goal by aggressive risk modification, healthy and therapeutic life style changes for cardiovascular risk reduction.  Various goals are discussed and multiple questions answered.

## 2020-05-29 NOTE — LETTER
[facility]  [address] [address]  Phone: [phone]  Fax: [phone]    Peggy Florian MD        May 29, 2020     Manuelito Pena, [address] [place] [address]    Patient: Hunter Mccann  MR Number: [id number]  YOB: 1940  Date of Visit: 5/29/2020    Dear Dr. Billings Dates:    Thank you for the request for consultation for Hunter Mccann to me for the evaluation of CHB. Below are the relevant portions of my assessment and plan of care. If you have questions, please do not hesitate to call me. I look forward to following Susie Milner along with you.     Sincerely,        Peggy Florian MD

## 2020-08-04 ENCOUNTER — PROCEDURE VISIT (OUTPATIENT)
Dept: CARDIOLOGY CLINIC | Age: 80
End: 2020-08-04
Payer: MEDICARE

## 2020-08-04 PROCEDURE — 93294 REM INTERROG EVL PM/LDLS PM: CPT | Performed by: INTERNAL MEDICINE

## 2020-08-04 PROCEDURE — 93296 REM INTERROG EVL PM/IDS: CPT | Performed by: INTERNAL MEDICINE

## 2020-11-08 PROCEDURE — 93296 REM INTERROG EVL PM/IDS: CPT | Performed by: INTERNAL MEDICINE

## 2020-11-08 PROCEDURE — 93294 REM INTERROG EVL PM/LDLS PM: CPT | Performed by: INTERNAL MEDICINE

## 2020-11-16 ENCOUNTER — PROCEDURE VISIT (OUTPATIENT)
Dept: CARDIOLOGY CLINIC | Age: 80
End: 2020-11-16
Payer: MEDICARE

## 2020-11-16 ENCOUNTER — TELEPHONE (OUTPATIENT)
Dept: CARDIOLOGY CLINIC | Age: 80
End: 2020-11-16

## 2020-11-16 NOTE — LETTER
Cardiology [address] [facility] [address]  Phone: [phone]  Fax: [phone]    11/16/2020        [address] [place] [id number]            Dear Justice Srinivasan: This is your [facility] schedule. You can inocente your calendar with these dates. Remember that your device is wireless and should automatically do these checks while you are sleeping. If for any reason I do not get your transmission then I will call you and ask that you send a manual transmission. If you have any questions or concerns, please call and ask for Rachael Armendariz at [phone]. Thank you.

## 2020-12-08 ENCOUNTER — TELEMEDICINE (OUTPATIENT)
Dept: CARDIOLOGY CLINIC | Age: 80
End: 2020-12-08
Payer: MEDICARE

## 2020-12-08 PROCEDURE — 1123F ACP DISCUSS/DSCN MKR DOCD: CPT | Performed by: INTERNAL MEDICINE

## 2020-12-08 PROCEDURE — 99213 OFFICE O/P EST LOW 20 MIN: CPT | Performed by: INTERNAL MEDICINE

## 2020-12-08 PROCEDURE — 1090F PRES/ABSN URINE INCON ASSESS: CPT | Performed by: INTERNAL MEDICINE

## 2020-12-08 PROCEDURE — G8400 PT W/DXA NO RESULTS DOC: HCPCS | Performed by: INTERNAL MEDICINE

## 2020-12-08 PROCEDURE — G8427 DOCREV CUR MEDS BY ELIG CLIN: HCPCS | Performed by: INTERNAL MEDICINE

## 2020-12-08 PROCEDURE — 4040F PNEUMOC VAC/ADMIN/RCVD: CPT | Performed by: INTERNAL MEDICINE

## 2020-12-08 NOTE — LETTER
[facility]  [address] [address]  Phone: [phone]  Fax: [phone]    Myrtle Murdock MD        December 8, 2020     Yovanny Gastelum, [address] [place] [address]    Patient: Nataly Pineda  MR Number: [id number]  YOB: 1940  Date of Visit: 12/8/2020    Dear Dr. Yovanny Gastelum:    Thank you for the request for consultation for Nataly Pineda to me for the evaluation of CHB S/P PPM. Below are the relevant portions of my assessment and plan of care. If you have questions, please do not hesitate to call me. I look forward to following Emil Andres along with you.     Sincerely,        Myrtle Murdock MD

## 2020-12-08 NOTE — PATIENT INSTRUCTIONS
CAD:None known . HTN:well controlled on current medical regimen, see list above.              - changes in  treatment:   no   CARDIOMYOPATHY: None known   CONGESTIVE HEART FAILURE: NO KNOWN HISTORY.     VHD: No significant VHD noted  DYSLIPIDEMIA: Patient's profile is not available. OTHER RELEVANT DIAGNOSIS:as noted in patient's active problem list:  TESTS ORDERED:  None this visit                                      All previously ordered tests reviewed.   ARRHYTHMIAS:  Known H/O CHB S/P PPM   MEDICATIONS: CPM   Office f/u in six months. Device check per protocol. Primary/secondary prevention is the goal by aggressive risk modification, healthy and therapeutic life style changes for cardiovascular risk reduction.  Various goals are discussed and multiple questions answered

## 2020-12-08 NOTE — PROGRESS NOTES
OFFICE Meseret Navas is a 79 y.o. female who has    CHIEF COMPLAINT AS FOLLOWS:  CHEST PAIN: Patient denies any C/O chest pains at this time. SOB: No C/O SOB at this time.                 LEG EDEMA: No leg edema   PALPITATIONS: Denies any C/O Palpitations   DIZZINESS: No C/O Dizziness   SYNCOPE: None   OTHER:                                     HPI: Patient is here for F/U on her VHD & CHB problems. She does not have any complaints at this time. Lien Christie has the following history recorded in care path:  Patient Active Problem List    Diagnosis Date Noted    Bradycardia      Priority: High    Complete heart block ([facility] [address]      Priority: High    Precordial pain 03/03/2020    Pacemaker 05/30/2019    Heart block 05/08/2018    Syncope 01/26/2017    VHD (valvular heart disease) 06/30/2016    H/O echocardiogram 04/13/2015    H/O echocardiogram 04/13/2015    Family history of coronary artery disease     GERD (gastroesophageal reflux disease) 06/30/2014    Hypertension     LBBB (left bundle branch block)     Hernia, hiatal      Current Outpatient Medications   Medication Sig Dispense Refill    candesartan (ATACAND) 32 MG tablet Take 32 mg by mouth daily      atenolol (TENORMIN) 25 MG tablet Take 1 tablet by mouth daily 90 tablet 3    Probiotic Product (PROBIOTIC DAILY PO) Take  by mouth daily.  esomeprazole Magnesium (NEXIUM) 40 MG PACK Take 40 mg by mouth daily.  Calcium Carb-Cholecalciferol (CALCIUM 1000 + D PO) Take 1,000 mg by mouth daily.  therapeutic multivitamin-minerals (THERAGRAN-M) tablet Take 1 tablet by mouth daily. No current facility-administered medications for this visit.       Allergies: Hydrochlorothiazide; Triamterene; and Vioxx [rofecoxib]  Past Medical History:   Diagnosis Date    Arthritis     Family history of coronary artery disease     H/O echocardiogram 4/13/15    EF 55% Normal chamber sizes Left ventricular hypertrophy with normal LV systolic, but abnormal diastolic. Mild TR and aortic insufficiencies with mild pulm HTN. Mild-mod mitral regurg.  Hernia, hiatal     History of cardiovascular stress test 1/23/12    The post stress myocardial perfusion images show a normal pattern of perfusion in all regions. The post stress left ventricle is normal in size. Theres no scintigraphic evidence of inducible myocardial ischemia. No wall motion abnormalities seen. No previous study to compare with. The EF is 70%. Normal myocardial perfusion study.  History of complete electrocardiogram 1/23/12    History of echocardiogram 1/24/12    Theres mild asymmetric left ventricular hypertrophy. Left ventricular systolic function is normal. EF is >55%. Theres mild mitral regurg. noted. Theres mild tricuspid regurg. noted. Mild aortic regurg. noted.  History of Holter monitoring 02/09/2017    Sinus rhythm 48 hr holter     History of nuclear stress test 04/13/2015    EF 70%.  WNL    History of nuclear stress test 03/04/2020    EF >70%, Most likely normal test, Apical thinning seen is probably physiological    Hypertension     IBS (irritable bowel syndrome)     LBBB (left bundle branch block)      Past Surgical History:   Procedure Laterality Date    APPENDECTOMY  1952    BLADDER SURGERY  3/09    [address]SY  1972 & 1984   [address]    KNEE SURGERY  2005    Left Knee    PACEMAKER INSERTION Left 05/09/2018    [employer] [place] XT DR GINI Jenkins PPM    SINUS SURGERY  1985      As reviewed   Family History   Problem Relation Age of Onset    Stroke Mother         CVA    Stroke Brother         CVA    Heart Attack Brother     Diabetes Daughter      Social History     Tobacco Use    Smoking status: Never Smoker    Smokeless tobacco: Never Used   Substance Use Topics    Alcohol use: Yes     Comment: Rarely      Review of Systems:    Constitutional: Negative for diaphoresis and fatigue  Psychological:Negative for anxiety or depression  HENT: Negative for headaches, nasal congestion, sinus pain or vertigo  Eyes: Negative for visual disturbance. Endocrine: Negative for polydipsia/polyuria  Respiratory: Negative for shortness of breath  Cardiovascular: Negative for chest pain, dyspnea on exertion, claudication, edema, irregular heartbeat, murmur, palpitations or shortness of breath  Gastrointestinal: Negative for abdominal pain or heartburn  Genito-Urinary: Negative for urinary frequency/urgency  Musculoskeletal: Negative for muscle pain, muscular weakness, negative for pain in arm and leg or swelling in foot and leg  Neurological: Negative for dizziness, headaches, memory loss, numbness/tingling, visual changes, syncope  Dermatological: Negative for rash    Objective: Wt Readings from Last 3 Encounters:   05/29/20 110 lb (49.9 kg)   03/03/20 115 lb 6.4 oz (52.3 kg)   11/22/19 115 lb 3.2 oz (52.3 kg)     GENERAL - Alert, oriented, pleasant, in no apparent distress. EYES: No jaundice, no conjunctival pallor. SKIN:  No rashes. No Echhymosis    HEENT - No clinically significant abnormalities seen. Neck - Supple. Cardiovascular -     Extremities - No cyanosis, clubbing, or significant edema. Pulmonary - No respiratory distress. .    Abdomen -   Musculoskeletal - No significant edema. No joint deformities. No muscle wasting. Neurologic -  There were no gross focal neurologic abnormalities.     Lab Review   Lab Results   Component Value Date    TROPONINT <0.010 05/09/2018    TROPONINT <0.010 05/08/2018     BNP:  No results found for: BNP, PROBNP  PT/INR:    Lab Results   Component Value Date    INR 1.10 05/09/2018     No results found for: LABA1C  Lab Results   Component Value Date    WBC 11.7 (H) 05/09/2018    WBC 9.7 05/08/2018    HCT 37.0 05/09/2018    HCT 40.6 05/08/2018    MCV 97.6 05/09/2018    .2 (H) 05/08/2018     05/09/2018     05/08/2018     No results found for: CHOL, TRIG, HDL, LDLCALC, LDLDIRECT, CHOLHDLRATIO  Lab Results   Component Value Date    ALT 28 05/08/2018    ALT 11 01/20/2017    AST 44 (H) 05/08/2018    AST 18 01/20/2017     BMP:    Lab Results   Component Value Date     05/09/2018     05/08/2018    K 4.3 05/09/2018    K 4.0 05/08/2018     05/09/2018     05/08/2018    CO2 22 05/09/2018    CO2 18 05/08/2018    BUN 10 05/09/2018    BUN 14 05/08/2018    CREATININE 0.7 05/09/2018    CREATININE 0.8 05/08/2018     CMP:   Lab Results   Component Value Date     05/09/2018     05/08/2018    K 4.3 05/09/2018    K 4.0 05/08/2018     05/09/2018     05/08/2018    CO2 22 05/09/2018    CO2 18 05/08/2018    BUN 10 05/09/2018    BUN 14 05/08/2018    CREATININE 0.7 05/09/2018    CREATININE 0.8 05/08/2018    PROT 7.5 05/08/2018    PROT 7.8 01/20/2017     TSH:    Lab Results   Component Value Date    TSHHS 3.450 05/08/2018       QUALITY MEASURES REVIEWED:  1.CAD:Patient is taking anti platelet agent:No  2. DYSLIPIDEMIA: Patient is on cholesterol lowering medication:No  3. Beta-Blocker therapy for CAD, if prior Myocardial Infarction:Yes  4. Atrial fibrillation & anticoagulation therapy No    Impression:    1. Essential hypertension    2. Complete heart block ([address]    3. VHD (valvular heart disease)    4. Syncope, unspecified syncope type    5.  Pacemaker       Patient Active Problem List   Diagnosis Code    Hypertension I10    LBBB (left bundle branch block) I44.7    Hernia, hiatal K44.9    GERD (gastroesophageal reflux disease) K21.9    Family history of coronary artery disease Z80.55    H/O echocardiogram Z92.89    H/O echocardiogram Z92.89    VHD (valvular heart disease) I38    Syncope R55    Bradycardia R00.1    Complete heart block (HCC) I44.2    Heart block I45.9    Pacemaker Z95.0    Precordial pain R07.2       Assessment & Plan:    being evaluated by a Virtual Visit (video visit) encounter to address concerns as mentioned above. A caregiver was present when appropriate. Due to this being a TeleHealth encounter (During UAQTR-01 public health emergency), evaluation of the following organ systems was limited: Vitals/Constitutional/EENT/Resp/CV/GI//MS/Neuro/Skin/Heme-Lymph-Imm. Pursuant to the emergency declaration under the [facility] [address] and the [employer] Act, this Virtual Visit was conducted with patient's (and/or legal guardian's) consent, to reduce the patient's risk of exposure to COVID-19 and provide necessary medical care. The patient (and/or legal guardian) has also been advised to contact this office for worsening conditions or problems, and seek emergency medical treatment and/or call 911 if deemed necessary. Time spent during this visit 15 min. CAD:None known . HTN:well controlled on current medical regimen, see list above.              - changes in  treatment:   no   CARDIOMYOPATHY: None known   CONGESTIVE HEART FAILURE: NO KNOWN HISTORY.     VHD: No significant VHD noted  DYSLIPIDEMIA: Patient's profile is not available. OTHER RELEVANT DIAGNOSIS:as noted in patient's active problem list:  TESTS ORDERED:  None this visit                                      All previously ordered tests reviewed.   ARRHYTHMIAS:  Known H/O CHB S/P PPM   MEDICATIONS: CPM   Office f/u in six months. Device check per protocol. Primary/secondary prevention is the goal by aggressive risk modification, healthy and therapeutic life style changes for cardiovascular risk reduction.  Various goals are discussed and multiple questions answered

## 2021-02-23 ENCOUNTER — PROCEDURE VISIT (OUTPATIENT)
Dept: CARDIOLOGY CLINIC | Age: 81
End: 2021-02-23
Payer: MEDICARE

## 2021-02-23 DIAGNOSIS — Z95.0 CARDIAC PACEMAKER IN SITU: Primary | ICD-10-CM

## 2021-02-23 DIAGNOSIS — I45.9 HEART BLOCK: ICD-10-CM

## 2021-02-23 PROCEDURE — 93296 REM INTERROG EVL PM/IDS: CPT | Performed by: INTERNAL MEDICINE

## 2021-02-23 PROCEDURE — 93294 REM INTERROG EVL PM/LDLS PM: CPT | Performed by: INTERNAL MEDICINE

## 2021-05-30 PROCEDURE — 93294 REM INTERROG EVL PM/LDLS PM: CPT | Performed by: INTERNAL MEDICINE

## 2021-05-30 PROCEDURE — 93296 REM INTERROG EVL PM/IDS: CPT | Performed by: INTERNAL MEDICINE

## 2021-06-07 ENCOUNTER — PROCEDURE VISIT (OUTPATIENT)
Dept: CARDIOLOGY CLINIC | Age: 81
End: 2021-06-07
Payer: MEDICARE

## 2021-06-07 DIAGNOSIS — I45.9 HEART BLOCK: ICD-10-CM

## 2021-06-07 DIAGNOSIS — Z95.0 CARDIAC PACEMAKER IN SITU: Primary | ICD-10-CM

## 2021-06-24 ENCOUNTER — OFFICE VISIT (OUTPATIENT)
Dept: CARDIOLOGY CLINIC | Age: 81
End: 2021-06-24
Payer: MEDICARE

## 2021-06-24 VITALS
HEART RATE: 63 BPM | HEIGHT: 60 IN | DIASTOLIC BLOOD PRESSURE: 62 MMHG | SYSTOLIC BLOOD PRESSURE: 138 MMHG | BODY MASS INDEX: 21.6 KG/M2 | WEIGHT: 110 LBS

## 2021-06-24 DIAGNOSIS — R00.1 BRADYCARDIA: ICD-10-CM

## 2021-06-24 DIAGNOSIS — I38 VHD (VALVULAR HEART DISEASE): ICD-10-CM

## 2021-06-24 DIAGNOSIS — I10 ESSENTIAL HYPERTENSION: ICD-10-CM

## 2021-06-24 DIAGNOSIS — I44.2 COMPLETE HEART BLOCK (HCC): Primary | ICD-10-CM

## 2021-06-24 DIAGNOSIS — Z82.49 FAMILY HISTORY OF CORONARY ARTERY DISEASE: ICD-10-CM

## 2021-06-24 DIAGNOSIS — Z95.0 PACEMAKER: ICD-10-CM

## 2021-06-24 DIAGNOSIS — R55 SYNCOPE, UNSPECIFIED SYNCOPE TYPE: ICD-10-CM

## 2021-06-24 DIAGNOSIS — I44.7 LBBB (LEFT BUNDLE BRANCH BLOCK): ICD-10-CM

## 2021-06-24 PROCEDURE — G8427 DOCREV CUR MEDS BY ELIG CLIN: HCPCS | Performed by: INTERNAL MEDICINE

## 2021-06-24 PROCEDURE — 1123F ACP DISCUSS/DSCN MKR DOCD: CPT | Performed by: INTERNAL MEDICINE

## 2021-06-24 PROCEDURE — 99213 OFFICE O/P EST LOW 20 MIN: CPT | Performed by: INTERNAL MEDICINE

## 2021-06-24 PROCEDURE — 1036F TOBACCO NON-USER: CPT | Performed by: INTERNAL MEDICINE

## 2021-06-24 PROCEDURE — G8420 CALC BMI NORM PARAMETERS: HCPCS | Performed by: INTERNAL MEDICINE

## 2021-06-24 PROCEDURE — 1090F PRES/ABSN URINE INCON ASSESS: CPT | Performed by: INTERNAL MEDICINE

## 2021-06-24 PROCEDURE — 4040F PNEUMOC VAC/ADMIN/RCVD: CPT | Performed by: INTERNAL MEDICINE

## 2021-06-24 PROCEDURE — G8400 PT W/DXA NO RESULTS DOC: HCPCS | Performed by: INTERNAL MEDICINE

## 2021-06-24 NOTE — LETTER
[address] [address]  Phone: [phone]  Fax: [phone]    Adi Kramer MD    June 24, 2021     Gaye Hadley MD  [place] [id number]    Patient: Nava Velázquez   MR Number: [id number]   YOB: 1940   Date of Visit: 6/24/2021       Dear Gaye Hadley:    Thank you for referring aNva Velázquez to me for evaluation/treatment. Below are the relevant portions of my assessment and plan of care. If you have questions, please do not hesitate to call me. I look forward to following Kendall Fabian along with you.     Sincerely,        Adi Kramer MD

## 2021-06-24 NOTE — PROGRESS NOTES
OFFICE Farzana Vences is a 79 y.o. female who has    CHIEF COMPLAINT AS FOLLOWS:  CHEST PAIN: Patient denies any C/O chest pains at this time. SOB: No C/O SOB at this time.                 LEG EDEMA: No leg edema   PALPITATIONS: Denies any C/O Palpitations   DIZZINESS: No C/O Dizziness   SYNCOPE: None   OTHER: Fatigue                                    HPI: Patient is here for F/U on her VHD, HTN & Dyslipidemia problems. VHD: Patient has known Hx of mitral / aortic valve disease. Has had valve repair / surgery in the past.  HTN: Patient has known Hx of essential HTN. Has been treated with guideline recommended medical / physical/ diet therapy as stated below. Dyslipidemia: Patient has known Hx of mixed dyslipidemia. Has been treated with guideline recommended medical / physical/ diet therapy as stated below. She does not have any new complaints at this time. Current Outpatient Medications   Medication Sig Dispense Refill    candesartan (ATACAND) 32 MG tablet Take 32 mg by mouth daily      atenolol (TENORMIN) 25 MG tablet Take 1 tablet by mouth daily 90 tablet 3    Probiotic Product (PROBIOTIC DAILY PO) Take  by mouth daily.  esomeprazole Magnesium (NEXIUM) 40 MG PACK Take 40 mg by mouth daily.  Calcium Carb-Cholecalciferol (CALCIUM 1000 + D PO) Take 1,000 mg by mouth daily.  therapeutic multivitamin-minerals (THERAGRAN-M) tablet Take 1 tablet by mouth daily. No current facility-administered medications for this visit.      Allergies: Hydrochlorothiazide, Triamterene, and Vioxx [rofecoxib]  Review of Systems:    Constitutional: Negative for diaphoresis and fatigue  Respiratory: Negative for shortness of breath  Cardiovascular: Negative for chest pain, dyspnea on exertion, claudication, edema, irregular heartbeat, murmur, palpitations or shortness of breath  Musculoskeletal: Negative for muscle pain, muscular weakness, negative for pain in arm and leg or swelling in foot and leg    Objective:  /62   Pulse 63   Ht 5' (1.524 m)   Wt 110 lb (49.9 kg)   BMI 21.48 kg/m²   Wt Readings from Last 3 Encounters:   06/24/21 110 lb (49.9 kg)   05/29/20 110 lb (49.9 kg)   03/03/20 115 lb 6.4 oz (52.3 kg)     Body mass index is 21.48 kg/m². GENERAL - Alert, oriented, pleasant, in no apparent distress. EYES: No jaundice, no conjunctival pallor. Neck - Supple. No jugular venous distention noted. No carotid bruits. Cardiovascular - Normal S1 and S2 without obvious murmur or gallop. Extremities - No cyanosis, clubbing, or significant edema. Pulmonary - No respiratory distress. No wheezes or rales.       Lab Review   Lab Results   Component Value Date    TROPONINT <0.010 05/09/2018    TROPONINT <0.010 05/08/2018     No results found for: BNP, PROBNP  Lab Results   Component Value Date    INR 1.10 05/09/2018     No results found for: LABA1C  Lab Results   Component Value Date    WBC 11.7 (H) 05/09/2018    WBC 9.7 05/08/2018    HCT 37.0 05/09/2018    HCT 40.6 05/08/2018    MCV 97.6 05/09/2018    .2 (H) 05/08/2018     05/09/2018     05/08/2018     No results found for: CHOL, TRIG, HDL, LDLCALC, LDLDIRECT, CHOLHDLRATIO  Lab Results   Component Value Date    ALT 28 05/08/2018    ALT 11 01/20/2017    AST 44 (H) 05/08/2018    AST 18 01/20/2017     BMP:    Lab Results   Component Value Date     05/09/2018     05/08/2018    K 4.3 05/09/2018    K 4.0 05/08/2018     05/09/2018     05/08/2018    CO2 22 05/09/2018    CO2 18 05/08/2018    BUN 10 05/09/2018    BUN 14 05/08/2018    CREATININE 0.7 05/09/2018    CREATININE 0.8 05/08/2018     CMP:   Lab Results   Component Value Date     05/09/2018     05/08/2018    K 4.3 05/09/2018    K 4.0 05/08/2018     05/09/2018     05/08/2018    CO2 22 05/09/2018    CO2 18 05/08/2018    BUN 10 05/09/2018    BUN 14 05/08/2018    CREATININE 0.7 05/09/2018    CREATININE 0.8 05/08/2018    PROT 7.5 05/08/2018    PROT 7.8 01/20/2017     Lab Results   Component Value Date    TSHHS 3.450 05/08/2018     ECHO 5/2018   This is a limited echocardiogram s/p PPM insertion.    Left ventricular systolic function is normal.    Ejection fraction is visually estimated at 60%.  PPM wiring visualized in right atrium and ventricle.    No evidence of any pericardial effusion. CARDIOLITE 3/2020    Most likely normal test. Apical thinning seen is probably physiological.    Normal LV function. LVEF is > 70 %. QUALITY MEASURES REVIEWED:  1.CAD:Patient is taking anti platelet agent:No  Patient does not have Hx of documented CAD  2. DYSLIPIDEMIA: Patient is on cholesterol lowering medication:No   3. Beta-Blocker therapy for CAD, if prior Myocardial Infarction:Yes   4. Counselled regarding smoking cessation. No   Patient does not Smoke. 5.Anticoagulation therapy (for A.Fib) No   Does Not have A.Fib.  6.Discussed weight management strategies. Assessment & Plan:  Primary / Secondary prevention is the goal by aggressive risk modification, healthy and therapeutic life style changes for cardiovascular risk reduction. Various goals are discussed and multiple questions answered.     CAD:None known . HTN:well controlled on current medical regimen, see list above.              - changes in  treatment:   no   CARDIOMYOPATHY: None known   CONGESTIVE HEART FAILURE: NO KNOWN HISTORY.     VHD: No significant VHD noted  DYSLIPIDEMIA: Patient's profile is not available. OTHER RELEVANT DIAGNOSIS:as noted in patient's active problem list:  TESTS ORDERED: Echo                                      All previously ordered tests reviewed.   ARRHYTHMIAS:  Known H/O CHB S/P PPM   MEDICATIONS: CPM   Office f/u in six months. Device check per protocol.

## 2021-06-24 NOTE — LETTER
Carol Ann Spear  1940  [id number]    Have you had any Chest Pain that is not new? - No       DO EKG IF: Patient has a Heart Rate above 100 or below 40     CAD (Coronary Artery Disease) patient should have one on file every 6 months        Have you had any Shortness of Breath - No      Have you had any dizziness - No       Sitting wait 5 minutes do supine (laying down) wait 5 minutes then do standing - log each in \"vitals\" area in Epic   Be sure to ask what symptoms they are having if they get dizzy while completing ortho stats such as: room spinning, nausea, etc.    Have you had any palpitations that are not new?  - No      Is the patient on any of the following medications -    If Yes DO EKG - Needs done every 6 months    Do you have any edema - swelling in No        Do you have a surgery or procedure scheduled in the near future - No per pt

## 2021-06-24 NOTE — PATIENT INSTRUCTIONS
Please be informed that if you contact our office outside of normal business hours the physician on call cannot help with any scheduling or rescheduling issues, procedure instruction questions or any type of medication issue. We advise you for any urgent/emergency that you go to the nearest emergency room! PLEASE CALL OUR OFFICE DURING NORMAL BUSINESS HOURS    Monday - Friday   8 am to 5 pm    [facility]: [phone]    [place]:  [phone]  **It is YOUR responsibilty to bring medication bottles and/or updated medication list to [address] [place] [address]. This will allow us to better serve you and all your healthcare needs**  CAD:None known . HTN:well controlled on current medical regimen, see list above.              - changes in  treatment:   no   CARDIOMYOPATHY: None known   CONGESTIVE HEART FAILURE: NO KNOWN HISTORY.     VHD: No significant VHD noted  DYSLIPIDEMIA: Patient's profile is not available. OTHER RELEVANT DIAGNOSIS:as noted in patient's active problem list:  TESTS ORDERED: Echo                                      All previously ordered tests reviewed.   ARRHYTHMIAS:  Known H/O CHB S/P PPM   MEDICATIONS: CPM   Office f/u in six months. Device check per protocol.

## 2021-07-14 ENCOUNTER — PROCEDURE VISIT (OUTPATIENT)
Dept: CARDIOLOGY CLINIC | Age: 81
End: 2021-07-14
Payer: MEDICARE

## 2021-07-14 DIAGNOSIS — R55 SYNCOPE, UNSPECIFIED SYNCOPE TYPE: ICD-10-CM

## 2021-07-14 DIAGNOSIS — I44.2 COMPLETE HEART BLOCK (HCC): ICD-10-CM

## 2021-07-14 DIAGNOSIS — I10 ESSENTIAL HYPERTENSION: ICD-10-CM

## 2021-07-14 DIAGNOSIS — I38 VHD (VALVULAR HEART DISEASE): ICD-10-CM

## 2021-07-14 DIAGNOSIS — Z82.49 FAMILY HISTORY OF CORONARY ARTERY DISEASE: ICD-10-CM

## 2021-07-14 DIAGNOSIS — R00.1 BRADYCARDIA: ICD-10-CM

## 2021-07-14 DIAGNOSIS — Z95.0 PACEMAKER: ICD-10-CM

## 2021-07-14 DIAGNOSIS — I44.7 LBBB (LEFT BUNDLE BRANCH BLOCK): ICD-10-CM

## 2021-07-14 LAB
LV EF: 58 %
LVEF MODALITY: NORMAL

## 2021-07-14 PROCEDURE — 93306 TTE W/DOPPLER COMPLETE: CPT | Performed by: INTERNAL MEDICINE

## 2021-07-15 ENCOUNTER — TELEPHONE (OUTPATIENT)
Dept: CARDIOLOGY CLINIC | Age: 81
End: 2021-07-15

## 2021-07-15 NOTE — TELEPHONE ENCOUNTER
Patient verbally understood  Echo:   Left ventricular function and size is normal, EF is estimated at 55-60%. Mild left ventricular hypertrophy. Normal diastolic filling pattern for age. No regional wall motion abnormalities were detected. PPM wiring visualized within the right side of the heart. Mild mitral, tricuspid ,pulmonic and moderate aortic regurgitation is   present. RVSP is 29 mmHg.    No evidence of pericardial effusion

## 2021-09-10 ENCOUNTER — PROCEDURE VISIT (OUTPATIENT)
Dept: CARDIOLOGY CLINIC | Age: 81
End: 2021-09-10
Payer: MEDICARE

## 2021-09-10 DIAGNOSIS — I45.9 HEART BLOCK: ICD-10-CM

## 2021-09-10 DIAGNOSIS — Z95.0 CARDIAC PACEMAKER IN SITU: Primary | ICD-10-CM

## 2021-10-25 PROCEDURE — 93296 REM INTERROG EVL PM/IDS: CPT | Performed by: INTERNAL MEDICINE

## 2021-10-25 PROCEDURE — 93294 REM INTERROG EVL PM/LDLS PM: CPT | Performed by: INTERNAL MEDICINE

## 2021-12-15 ENCOUNTER — OFFICE VISIT (OUTPATIENT)
Dept: CARDIOLOGY CLINIC | Age: 81
End: 2021-12-15
Payer: MEDICARE

## 2021-12-15 VITALS
DIASTOLIC BLOOD PRESSURE: 80 MMHG | HEART RATE: 68 BPM | BODY MASS INDEX: 21.32 KG/M2 | WEIGHT: 108.6 LBS | HEIGHT: 60 IN | SYSTOLIC BLOOD PRESSURE: 134 MMHG

## 2021-12-15 DIAGNOSIS — I38 VHD (VALVULAR HEART DISEASE): ICD-10-CM

## 2021-12-15 DIAGNOSIS — Z95.0 PACEMAKER: ICD-10-CM

## 2021-12-15 DIAGNOSIS — I10 PRIMARY HYPERTENSION: Primary | ICD-10-CM

## 2021-12-15 DIAGNOSIS — R00.1 BRADYCARDIA: ICD-10-CM

## 2021-12-15 DIAGNOSIS — R55 SYNCOPE, UNSPECIFIED SYNCOPE TYPE: ICD-10-CM

## 2021-12-15 DIAGNOSIS — I44.2 COMPLETE HEART BLOCK (HCC): ICD-10-CM

## 2021-12-15 PROCEDURE — G8400 PT W/DXA NO RESULTS DOC: HCPCS | Performed by: INTERNAL MEDICINE

## 2021-12-15 PROCEDURE — 4040F PNEUMOC VAC/ADMIN/RCVD: CPT | Performed by: INTERNAL MEDICINE

## 2021-12-15 PROCEDURE — 1036F TOBACCO NON-USER: CPT | Performed by: INTERNAL MEDICINE

## 2021-12-15 PROCEDURE — G8420 CALC BMI NORM PARAMETERS: HCPCS | Performed by: INTERNAL MEDICINE

## 2021-12-15 PROCEDURE — 1123F ACP DISCUSS/DSCN MKR DOCD: CPT | Performed by: INTERNAL MEDICINE

## 2021-12-15 PROCEDURE — G8427 DOCREV CUR MEDS BY ELIG CLIN: HCPCS | Performed by: INTERNAL MEDICINE

## 2021-12-15 PROCEDURE — G8484 FLU IMMUNIZE NO ADMIN: HCPCS | Performed by: INTERNAL MEDICINE

## 2021-12-15 PROCEDURE — 99213 OFFICE O/P EST LOW 20 MIN: CPT | Performed by: INTERNAL MEDICINE

## 2021-12-15 PROCEDURE — 1090F PRES/ABSN URINE INCON ASSESS: CPT | Performed by: INTERNAL MEDICINE

## 2021-12-15 RX ORDER — LORAZEPAM 1 MG/1
1 TABLET ORAL EVERY 6 HOURS PRN
COMMUNITY

## 2021-12-15 NOTE — PROGRESS NOTES
OFFICE PROGRESS NOTES      Kristi Vergara is a 79 y.o. female who has    CHIEF COMPLAINT AS FOLLOWS:  CHEST PAIN: Patient denies any C/O chest pains at this time.     SOB: No C/O SOB at this time.                 LEG EDEMA: No leg edema   PALPITATIONS: Denies any C/O Palpitations   DIZZINESS: No C/O Dizziness   SYNCOPE: None   OTHER:                                     HPI: Patient is here for F/U on her VHD, HTN & Dyslipidemia problems. VHD: Patient has known  aortic valve disease. HTN: Patient has known essential HTN. Has been treated with guideline recommended medical / physical/ diet therapy as stated below. Dyslipidemia: Patient has known mixed dyslipidemia. Has been treated with guideline recommended medical / physical/ diet therapy as stated below. Current Outpatient Medications   Medication Sig Dispense Refill    LORazepam (ATIVAN) 1 MG tablet Take 1 mg by mouth every 6 hours as needed for Anxiety.  candesartan (ATACAND) 32 MG tablet Take 32 mg by mouth daily      atenolol (TENORMIN) 25 MG tablet Take 1 tablet by mouth daily 90 tablet 3    Probiotic Product (PROBIOTIC DAILY PO) Take  by mouth daily.  esomeprazole Magnesium (NEXIUM) 40 MG PACK Take 40 mg by mouth daily.  Calcium Carb-Cholecalciferol (CALCIUM 1000 + D PO) Take 1,000 mg by mouth daily.  therapeutic multivitamin-minerals (THERAGRAN-M) tablet Take 1 tablet by mouth daily. No current facility-administered medications for this visit.      Allergies: Hydrochlorothiazide, Triamterene, and Vioxx [rofecoxib]  Review of Systems:    Constitutional: Negative for diaphoresis and fatigue  Respiratory: Negative for shortness of breath  Cardiovascular: Negative for chest pain, dyspnea on exertion, claudication, edema, irregular heartbeat, murmur, palpitations or shortness of breath  Musculoskeletal: Negative for muscle pain, muscular weakness, negative for pain in arm and leg or swelling in foot and leg    Objective:  /80   Pulse 68   Ht 5' (1.524 m)   Wt 108 lb 9.6 oz (49.3 kg)   BMI 21.21 kg/m²   Wt Readings from Last 3 Encounters:   12/15/21 108 lb 9.6 oz (49.3 kg)   06/24/21 110 lb (49.9 kg)   05/29/20 110 lb (49.9 kg)     Body mass index is 21.21 kg/m². GENERAL - Alert, oriented, pleasant, in no apparent distress. EYES: No jaundice, no conjunctival pallor. Neck - Supple. No jugular venous distention noted. No carotid bruits. Cardiovascular - Normal S1 and S2 without obvious murmur or gallop. Extremities - No cyanosis, clubbing, or significant edema. Pulmonary - No respiratory distress. No wheezes or rales.       MEDICAL DECISION MAKING & DATA REVIEW:    Lab Review   Lab Results   Component Value Date    TROPONINT <0.010 05/09/2018    TROPONINT <0.010 05/08/2018     No results found for: BNP, PROBNP  Lab Results   Component Value Date    INR 1.10 05/09/2018     No results found for: LABA1C  Lab Results   Component Value Date    WBC 11.7 (H) 05/09/2018    WBC 9.7 05/08/2018    HCT 37.0 05/09/2018    HCT 40.6 05/08/2018    MCV 97.6 05/09/2018    .2 (H) 05/08/2018     05/09/2018     05/08/2018     No results found for: CHOL, TRIG, HDL, LDLCALC, LDLDIRECT, CHOLHDLRATIO  Lab Results   Component Value Date    ALT 28 05/08/2018    ALT 11 01/20/2017    AST 44 (H) 05/08/2018    AST 18 01/20/2017     BMP:    Lab Results   Component Value Date     05/09/2018     05/08/2018    K 4.3 05/09/2018    K 4.0 05/08/2018     05/09/2018     05/08/2018    CO2 22 05/09/2018    CO2 18 05/08/2018    BUN 10 05/09/2018    BUN 14 05/08/2018    CREATININE 0.7 05/09/2018    CREATININE 0.8 05/08/2018     CMP:   Lab Results   Component Value Date     05/09/2018     05/08/2018    K 4.3 05/09/2018    K 4.0 05/08/2018     05/09/2018     05/08/2018    CO2 22 05/09/2018    CO2 18 05/08/2018    BUN 10 05/09/2018    BUN 14 05/08/2018    CREATININE 0.7 05/09/2018    CREATININE 0.8 05/08/2018    PROT 7.5 05/08/2018    PROT 7.8 01/20/2017     Lab Results   Component Value Date    TSHHS 3.450 05/08/2018       ECHO 7/2021   Left ventricular function and size is normal, EF is estimated at 55-60%. Mild left ventricular hypertrophy. Normal diastolic filling pattern for age. No regional wall motion abnormalities were detected. PPM wiring visualized within the right side of the heart. Mild mitral, tricuspid ,pulmonic and moderate aortic regurgitation is   present. RVSP is 29 mmHg. No evidence of pericardial effusion. QUALITY MEASURES REVIEWED:  1.CAD:Patient is taking anti platelet agent:No  Patient does not have Hx of documented CAD  2. DYSLIPIDEMIA: Patient is on cholesterol lowering medication:No   3. Beta-Blocker therapy for CAD, if prior Myocardial Infarction:Yes   4. Counselled regarding smoking cessation. No   Patient does not Smoke. 5.Anticoagulation therapy (for A.Fib) No   Does Not have A.Fib.  6.Discussed weight management strategies. Assessment & Plan:  Primary / Secondary prevention is the goal by aggressive risk modification, healthy and therapeutic life style changes for cardiovascular risk reduction. CORONARY ARTERY DISEASE:None known . HTN:well controlled on current medical regimen, see list above.              - changes in  treatment:   no, on Atacand & Tenormin     CARDIOMYOPATHY: None known   CONGESTIVE HEART FAILURE: NO KNOWN HISTORY.       VHD: Moderate AR    DYSLIPIDEMIA: Patient's profile is not available. OTHER RELEVANT DIAGNOSIS:as noted in patient's active problem list:     ARRHYTHMIAS:  Known H/O CHB S/P PPM  PACEMAKER device check results reviewed & explained to the patient. Still has 9 years of battery life left. TESTS ORDERED: None this visit     PREVIOUSLY ORDERED TESTS REVIEWED & DISCUSSED WITH THE PATIENT:     I personally reviewed & interpreted, all previously ordered tests as copied above.  Latest Labs are pulled in to the note with dates. Labs, specially in Reference to Lipid profile, Cardiac testing in the form of Echo, stress tests & other relevant cardiac testing reviewed with patient & recommendations made based on assessment of the results. MEDICATIONS: List of medications patient is currently taking is reviewed in detail with the patient & family member present. Discussed any side effects or problems taking the medication. Recommend Continue present management & medications as listed. AFFIRMATION: I spent at least 20 minutes of time reviewing patient's history, previous & current medical problems & all Labs + testing. This includes chart prep even prior to the vosit. Various goals are discussed and multiple questions answered. Relevant concelling performed. Office follow up in six months. Device check per protocol.

## 2021-12-15 NOTE — PATIENT INSTRUCTIONS
CORONARY ARTERY DISEASE:None known . HTN:well controlled on current medical regimen, see list above.              - changes in  treatment:   no, on Atacand & Tenormin     CARDIOMYOPATHY: None known   CONGESTIVE HEART FAILURE: NO KNOWN HISTORY.       VHD: Moderate AR    DYSLIPIDEMIA: Patient's profile is not available. OTHER RELEVANT DIAGNOSIS:as noted in patient's active problem list:     ARRHYTHMIAS:  Known H/O CHB S/P PPM  PACEMAKER device check results reviewed & explained to the patient. Still has 9 years of battery life left. TESTS ORDERED: None this visit     PREVIOUSLY ORDERED TESTS REVIEWED & DISCUSSED WITH THE PATIENT:     I personally reviewed & interpreted, all previously ordered tests as copied above. Latest Labs are pulled in to the note with dates. Labs, specially in Reference to Lipid profile, Cardiac testing in the form of Echo, stress tests & other relevant cardiac testing reviewed with patient & recommendations made based on assessment of the results. MEDICATIONS: List of medications patient is currently taking is reviewed in detail with the patient & family member present. Discussed any side effects or problems taking the medication. Recommend Continue present management & medications as listed. AFFIRMATION: I spent at least 20 minutes of time reviewing patient's history, previous & current medical problems & all Labs + testing. This includes chart prep even prior to the vosit. Various goals are discussed and multiple questions answered. Relevant concelling performed. Office follow up in six months. Device check per protocol.

## 2021-12-15 NOTE — LETTER
[facility] [address] [address]  Phone: [phone]  Fax: [phone]    Lacy Hunter MD    December 15, 2021     Damaris Parish, [address] [place] [address]    Patient: Fran Arshad   MR Number: [id number]   YOB: 1940   Date of Visit: 12/15/2021       Dear Damaris Parish:    Thank you for referring Fran Arshad to me for evaluation/treatment. Below are the relevant portions of my assessment and plan of care. If you have questions, please do not hesitate to call me. I look forward to following Elie Gordon along with you.     Sincerely,      Lacy Hunter MD

## 2021-12-16 ENCOUNTER — TELEPHONE (OUTPATIENT)
Dept: CARDIOLOGY CLINIC | Age: 81
End: 2021-12-16

## 2021-12-20 ENCOUNTER — PROCEDURE VISIT (OUTPATIENT)
Dept: CARDIOLOGY CLINIC | Age: 81
End: 2021-12-20

## 2021-12-20 ENCOUNTER — TELEPHONE (OUTPATIENT)
Dept: CARDIOLOGY CLINIC | Age: 81
End: 2021-12-20

## 2021-12-20 DIAGNOSIS — Z95.0 CARDIAC PACEMAKER IN SITU: Primary | ICD-10-CM

## 2021-12-20 DIAGNOSIS — I45.9 HEART BLOCK: ICD-10-CM

## 2021-12-20 PROCEDURE — 93296 REM INTERROG EVL PM/IDS: CPT | Performed by: NURSE PRACTITIONER

## 2021-12-20 PROCEDURE — 93294 REM INTERROG EVL PM/LDLS PM: CPT | Performed by: NURSE PRACTITIONER

## 2022-03-28 ENCOUNTER — PROCEDURE VISIT (OUTPATIENT)
Dept: CARDIOLOGY CLINIC | Age: 82
End: 2022-03-28
Payer: MEDICARE

## 2022-03-28 DIAGNOSIS — I45.9 HEART BLOCK: ICD-10-CM

## 2022-03-28 DIAGNOSIS — Z95.0 CARDIAC PACEMAKER IN SITU: Primary | ICD-10-CM

## 2022-04-05 ENCOUNTER — TELEPHONE (OUTPATIENT)
Dept: CARDIOLOGY CLINIC | Age: 82
End: 2022-04-05

## 2022-04-14 PROCEDURE — 93296 REM INTERROG EVL PM/IDS: CPT | Performed by: INTERNAL MEDICINE

## 2022-04-14 PROCEDURE — 93294 REM INTERROG EVL PM/LDLS PM: CPT | Performed by: INTERNAL MEDICINE

## 2022-06-16 ENCOUNTER — OFFICE VISIT (OUTPATIENT)
Dept: CARDIOLOGY CLINIC | Age: 82
End: 2022-06-16
Payer: MEDICARE

## 2022-06-16 VITALS
HEIGHT: 60 IN | HEART RATE: 60 BPM | BODY MASS INDEX: 21.6 KG/M2 | SYSTOLIC BLOOD PRESSURE: 138 MMHG | WEIGHT: 110 LBS | DIASTOLIC BLOOD PRESSURE: 80 MMHG

## 2022-06-16 DIAGNOSIS — R55 SYNCOPE, UNSPECIFIED SYNCOPE TYPE: ICD-10-CM

## 2022-06-16 DIAGNOSIS — I44.2 COMPLETE HEART BLOCK (HCC): ICD-10-CM

## 2022-06-16 DIAGNOSIS — I10 PRIMARY HYPERTENSION: Primary | ICD-10-CM

## 2022-06-16 DIAGNOSIS — Z95.0 PACEMAKER: ICD-10-CM

## 2022-06-16 DIAGNOSIS — I38 VHD (VALVULAR HEART DISEASE): ICD-10-CM

## 2022-06-16 PROCEDURE — 1036F TOBACCO NON-USER: CPT | Performed by: INTERNAL MEDICINE

## 2022-06-16 PROCEDURE — 99213 OFFICE O/P EST LOW 20 MIN: CPT | Performed by: INTERNAL MEDICINE

## 2022-06-16 PROCEDURE — 1090F PRES/ABSN URINE INCON ASSESS: CPT | Performed by: INTERNAL MEDICINE

## 2022-06-16 PROCEDURE — G8427 DOCREV CUR MEDS BY ELIG CLIN: HCPCS | Performed by: INTERNAL MEDICINE

## 2022-06-16 PROCEDURE — 1123F ACP DISCUSS/DSCN MKR DOCD: CPT | Performed by: INTERNAL MEDICINE

## 2022-06-16 PROCEDURE — G8400 PT W/DXA NO RESULTS DOC: HCPCS | Performed by: INTERNAL MEDICINE

## 2022-06-16 PROCEDURE — G8420 CALC BMI NORM PARAMETERS: HCPCS | Performed by: INTERNAL MEDICINE

## 2022-06-16 NOTE — LETTER
[facility] [address]  [address] [place] [address]  Phone: [phone]  Fax: [phone]    Steve Arreaga MD    June 16, 2022     Alina Lo, [address] [place] [address]    Patient: Uyen Mcnamara   MR Number: [id number]   YOB: 1940   Date of Visit: 6/16/2022       Dear Alina Lo:    Thank you for referring Uyen Mcnamara to me for evaluation/treatment. Below are the relevant portions of my assessment and plan of care. If you have questions, please do not hesitate to call me. I look forward to following Juana Barros along with you.     Sincerely,      Steve Arreaga MD

## 2022-06-16 NOTE — LETTER
2022 11:15 AM    Patient Name: Trini Arredondo  : 1940  MRN# [id number]    REASON FOR VISIT:     Patient Active Problem List    Diagnosis Date Noted    Bradycardia     Complete heart block ([address]     Precordial pain 2020    Pacemaker 2019    Heart block 2018    Syncope 2017    VHD (valvular heart disease) 2016    H/O echocardiogram 2015    H/O echocardiogram 2015    Family history of coronary artery disease     GERD (gastroesophageal reflux disease) 2014    Hypertension     LBBB (left bundle branch block)     Hernia, hiatal        Allergies: Hydrochlorothiazide, Triamterene, and Vioxx [rofecoxib]      Current Outpatient Medications   Medication Sig Dispense Refill    LORazepam (ATIVAN) 1 MG tablet Take 1 mg by mouth every 6 hours as needed for Anxiety.  candesartan (ATACAND) 32 MG tablet Take 32 mg by mouth daily      atenolol (TENORMIN) 25 MG tablet Take 1 tablet by mouth daily 90 tablet 3    Probiotic Product (PROBIOTIC DAILY PO) Take  by mouth daily.  esomeprazole Magnesium (NEXIUM) 40 MG PACK Take 40 mg by mouth daily.  Calcium Carb-Cholecalciferol (CALCIUM 1000 + D PO) Take 1,000 mg by mouth daily.  therapeutic multivitamin-minerals (THERAGRAN-M) tablet Take 1 tablet by mouth daily. No current facility-administered medications for this visit.          Lab Review   Lab Results   Component Value Date    TROPONINT <0.010 2018    TROPONINT <0.010 2018     BNP:  No results found for: BNP, PROBNP  PT/INR:    Lab Results   Component Value Date    INR 1.10 2018     No results found for: LABA1C  Lab Results   Component Value Date    WBC 11.7 (H) 2018    WBC 9.7 2018    HCT 37.0 2018    HCT 40.6 2018    MCV 97.6 2018    .2 (H) 2018     2018     2018     No results found for: CHOL, TRIG, HDL, LDLCALC, LDLDIRECT, [facility]  Lab Results   Component Value Date    ALT 28 05/08/2018    ALT 11 01/20/2017    AST 44 (H) 05/08/2018    AST 18 01/20/2017     BMP:    Lab Results   Component Value Date     05/09/2018     05/08/2018    K 4.3 05/09/2018    K 4.0 05/08/2018     05/09/2018     05/08/2018    CO2 22 05/09/2018    CO2 18 05/08/2018    BUN 10 05/09/2018    BUN 14 05/08/2018    CREATININE 0.7 05/09/2018    CREATININE 0.8 05/08/2018     CMP:   Lab Results   Component Value Date     05/09/2018     05/08/2018    K 4.3 05/09/2018    K 4.0 05/08/2018     05/09/2018     05/08/2018    CO2 22 05/09/2018    CO2 18 05/08/2018    BUN 10 05/09/2018    BUN 14 05/08/2018    CREATININE 0.7 05/09/2018    CREATININE 0.8 05/08/2018    PROT 7.5 05/08/2018    PROT 7.8 01/20/2017     TSH:    Lab Results   Component Value Date    TSHHS 3.450 05/08/2018       Smoke: What:                           How much:    Alcohol: How Much:     Caffeine: Pop:         Tea:            Coffee:                Chocolate:    Exercise:    Last Visit:  Complaints:  Changes:    LAST EKG: NONE    VENOUS DOPPLER: NONE    HOLTER/ EVENT MONITOR: NONE    STRESS TEST:  3/2020   Most likely normal test. Apical thinning seen is probably physiological.    Normal LV function. LVEF is > 70 %.    Supervising physician Dr. Eric Jenkins .        Recommendation       ECHO: 7/2021   Left ventricular function and size is normal, EF is estimated at 55-60%. Mild left ventricular hypertrophy. Normal diastolic filling pattern for age. No regional wall motion abnormalities were detected. PPM wiring visualized within the right side of the heart. Mild mitral, tricuspid ,pulmonic and moderate aortic regurgitation is   present. RVSP is 29 mmHg. No evidence of pericardial effusion.        CAROTID: NONE    MUGA: NONE    LAST PACER CHECK: NONE    CARDIAC CATH: 5/2018  [employer] Pacemaker  Amio Protocol:    CHADS: [id number] Score for Atrial Fibrillation Stroke Risk   Risk   Factors  Component Value   C CHF No 0   H HTN Yes 1   A2 Age >= 76 Yes,  (79 y.o.) 2   D DM No 0   S2 Prior Stroke/TIA No 0   V Vascular Disease No 0   A Age 74-69 No,  (79 y.o.) 0   Sc Sex female 1    [id number]  Score  4   Score last updated 6/16/22 6:67 AM EDT    Click here for a link to the UpToDate guideline \"Atrial Fibrillation: Anticoagulation therapy to prevent embolization    Disclaimer: Risk Score calculation is dependent on accuracy of patient problem list and past encounter diagnosis.

## 2022-06-16 NOTE — PATIENT INSTRUCTIONS
CORONARY ARTERY DISEASE:None known . 3/2020    Most likely normal test. Apical thinning seen is probably physiological.    Normal LV function. LVEF is > 70 %.      HTN:well controlled on current medical regimen, see list above.              - changes in  treatment:   no, on Atacand & Tenormin      CARDIOMYOPATHY: None known   CONGESTIVE HEART FAILURE: NO KNOWN HISTORY.        VHD: Moderate AR  ECHO 7/2021   Left ventricular function and size is normal, EF is estimated at 55-60%.  Mild left ventricular hypertrophy.   Normal diastolic filling pattern for age.   No regional wall motion abnormalities were detected.  PPM wiring visualized within the right side of the heart.   Mild mitral, tricuspid ,pulmonic and moderate aortic regurgitation is   present.  RVSP is 29 mmHg.   No evidence of pericardial effusion.      DYSLIPIDEMIA: Patient's profile is not available.     ARRHYTHMIAS:  Known H/O CHB S/P PPM  PACEMAKER device check results reviewed & explained to the patient. Still has 8 years of battery life left. TESTS ORDERED: none this visit     PREVIOUSLY ORDERED TESTS REVIEWED & DISCUSSED WITH THE PATIENT:     I personally reviewed & interpreted, all previously ordered tests as copied above. Latest Labs are pulled in to the note with dates. Labs, specially in Reference to Lipid profile, Cardiac testing in the form of Echo ( dated: ), stress tests ( dated: ) & other relevant cardiac testing reviewed with patient & recommendations made based on assessment of the results. Discussed role of Cardiac risk factors & effects + treatment of co morbidities with patient & advised accordingly. MEDICATIONS: List of medications patient is currently taking is reviewed in detail with the patient. Discussed any side effects or problems taking the medication. Recommend Continue present management & medications as listed.      AFFIRMATION: I spent at least 20 minutes of time reviewing patient's history, previous &

## 2022-06-16 NOTE — PROGRESS NOTES
muscular weakness, negative for pain in arm and leg or swelling in foot and leg    Objective:  /80   Pulse 60   Ht 5' (1.524 m)   Wt 110 lb (49.9 kg)   BMI 21.48 kg/m²   Wt Readings from Last 3 Encounters:   06/16/22 110 lb (49.9 kg)   12/15/21 108 lb 9.6 oz (49.3 kg)   06/24/21 110 lb (49.9 kg)     Body mass index is 21.48 kg/m². GENERAL - Alert, oriented, pleasant, in no apparent distress. EYES: No jaundice, no conjunctival pallor. Neck - Supple. No jugular venous distention noted. No carotid bruits. Cardiovascular - Normal S1 and S2 without obvious murmur or gallop. Extremities - No cyanosis, clubbing, or significant edema. Pulmonary - No respiratory distress. No wheezes or rales.       MEDICAL DECISION MAKING & DATA REVIEW:    Lab Review   Lab Results   Component Value Date    TROPONINT <0.010 05/09/2018    TROPONINT <0.010 05/08/2018     No results found for: BNP, PROBNP  Lab Results   Component Value Date    INR 1.10 05/09/2018     No results found for: LABA1C  Lab Results   Component Value Date    WBC 11.7 (H) 05/09/2018    WBC 9.7 05/08/2018    HCT 37.0 05/09/2018    HCT 40.6 05/08/2018    MCV 97.6 05/09/2018    .2 (H) 05/08/2018     05/09/2018     05/08/2018     No results found for: CHOL, TRIG, HDL, LDLCALC, LDLDIRECT, CHOLHDLRATIO  Lab Results   Component Value Date    ALT 28 05/08/2018    ALT 11 01/20/2017    AST 44 (H) 05/08/2018    AST 18 01/20/2017     BMP:    Lab Results   Component Value Date     05/09/2018     05/08/2018    K 4.3 05/09/2018    K 4.0 05/08/2018     05/09/2018     05/08/2018    CO2 22 05/09/2018    CO2 18 05/08/2018    BUN 10 05/09/2018    BUN 14 05/08/2018    CREATININE 0.7 05/09/2018    CREATININE 0.8 05/08/2018     CMP:   Lab Results   Component Value Date     05/09/2018     05/08/2018    K 4.3 05/09/2018    K 4.0 05/08/2018     05/09/2018     05/08/2018    CO2 22 05/09/2018    CO2 18 05/08/2018    BUN 10 05/09/2018    BUN 14 05/08/2018    CREATININE 0.7 05/09/2018    CREATININE 0.8 05/08/2018    PROT 7.5 05/08/2018    PROT 7.8 01/20/2017     Lab Results   Component Value Date    TSHHS 3.450 05/08/2018       QUALITY MEASURES REVIEWED:  1.CAD:Patient is taking anti platelet agent:No  Patient does not have Hx of documented CAD  2. DYSLIPIDEMIA: Patient is on cholesterol lowering medication:No   3. Beta-Blocker therapy for CAD, if prior Myocardial Infarction:Yes   4. Counselled regarding smoking cessation. No   Patient does not Smoke. 5.Anticoagulation therapy (for A.Fib) No   Does Not have A.Fib.  6.Discussed weight management strategies. Assessment & Plan:  Primary / Secondary prevention is the goal by aggressive risk modification, healthy and therapeutic life style changes for cardiovascular risk reduction. CORONARY ARTERY DISEASE:None known . 3/2020    Most likely normal test. Apical thinning seen is probably physiological.    Normal LV function. LVEF is > 70 %.      HTN:well controlled on current medical regimen, see list above.              - changes in  treatment:   no, on Atacand & Tenormin      CARDIOMYOPATHY: None known   CONGESTIVE HEART FAILURE: NO KNOWN HISTORY.        VHD: Moderate AR  ECHO 7/2021   Left ventricular function and size is normal, EF is estimated at 55-60%.  Mild left ventricular hypertrophy.   Normal diastolic filling pattern for age.   No regional wall motion abnormalities were detected.  PPM wiring visualized within the right side of the heart.   Mild mitral, tricuspid ,pulmonic and moderate aortic regurgitation is   present.  RVSP is 29 mmHg.   No evidence of pericardial effusion.      DYSLIPIDEMIA: Patient's profile is not available.     ARRHYTHMIAS:  Known H/O CHB S/P PPM  PACEMAKER device check results reviewed & explained to the patient. Still has 8 years of battery life left.     TESTS ORDERED: none this visit     PREVIOUSLY ORDERED TESTS REVIEWED & DISCUSSED WITH THE PATIENT:     I personally reviewed & interpreted, all previously ordered tests as copied above. Latest Labs are pulled in to the note with dates. Labs, specially in Reference to Lipid profile, Cardiac testing in the form of Echo ( dated: ), stress tests ( dated: ) & other relevant cardiac testing reviewed with patient & recommendations made based on assessment of the results. Discussed role of Cardiac risk factors & effects + treatment of co morbidities with patient & advised accordingly. MEDICATIONS: List of medications patient is currently taking is reviewed in detail with the patient. Discussed any side effects or problems taking the medication. Recommend Continue present management & medications as listed. AFFIRMATION: I spent at least 20 minutes of time reviewing patient's history, previous & current medical problems & all Labs + testing. This includes chart prep even prior to the vosit. Various goals are discussed and multiple questions answered. Relevant concelling performed. Office follow up in six months. Device check per protocol.

## 2022-06-20 ENCOUNTER — OFFICE VISIT (OUTPATIENT)
Dept: GASTROENTEROLOGY | Age: 82
End: 2022-06-20
Payer: MEDICARE

## 2022-06-20 VITALS
BODY MASS INDEX: 21.48 KG/M2 | DIASTOLIC BLOOD PRESSURE: 72 MMHG | TEMPERATURE: 97.1 F | SYSTOLIC BLOOD PRESSURE: 138 MMHG | HEART RATE: 66 BPM | OXYGEN SATURATION: 96 % | HEIGHT: 60 IN | WEIGHT: 109.4 LBS

## 2022-06-20 DIAGNOSIS — D64.9 ANEMIA, UNSPECIFIED TYPE: ICD-10-CM

## 2022-06-20 DIAGNOSIS — R10.13 DYSPEPSIA: Primary | ICD-10-CM

## 2022-06-20 LAB
ALBUMIN SERPL-MCNC: 4.9 G/DL
ALP BLD-CCNC: 59 U/L
ALT SERPL-CCNC: 10 U/L
ANION GAP SERPL CALCULATED.3IONS-SCNC: NORMAL MMOL/L
AST SERPL-CCNC: 14 U/L
BASOPHILS ABSOLUTE: 0.1 /ΜL
BASOPHILS RELATIVE PERCENT: 0.6 %
BILIRUB SERPL-MCNC: 0.2 MG/DL (ref 0.1–1.4)
BUN BLDV-MCNC: 13 MG/DL
CALCIUM SERPL-MCNC: 9.9 MG/DL
CHLORIDE BLD-SCNC: 101 MMOL/L
CO2: 24 MMOL/L
CREAT SERPL-MCNC: 0.7 MG/DL
EOSINOPHILS ABSOLUTE: 0.3 /ΜL
EOSINOPHILS RELATIVE PERCENT: 3.4 %
GFR CALCULATED: 86
GLUCOSE BLD-MCNC: 98 MG/DL
HCT VFR BLD CALC: 35.7 % (ref 36–46)
HEMOGLOBIN: 12.2 G/DL (ref 12–16)
LYMPHOCYTES ABSOLUTE: 2.8 /ΜL
LYMPHOCYTES RELATIVE PERCENT: 30.8 %
MCH RBC QN AUTO: 31 PG
MCHC RBC AUTO-ENTMCNC: 34.2 G/DL
MCV RBC AUTO: 90.6 FL
MONOCYTES ABSOLUTE: 0.6 /ΜL
MONOCYTES RELATIVE PERCENT: 6.8 %
NEUTROPHILS ABSOLUTE: 5.2 /ΜL
NEUTROPHILS RELATIVE PERCENT: 58.2 %
PLATELET # BLD: 379 K/ΜL
PMV BLD AUTO: 9.8 FL
POTASSIUM SERPL-SCNC: 4.9 MMOL/L
RBC # BLD: 3.94 10^6/ΜL
SODIUM BLD-SCNC: 136 MMOL/L
TOTAL PROTEIN: 7.1
TSH SERPL DL<=0.05 MIU/L-ACNC: 2.19 UIU/ML
WBC # BLD: 9 10^3/ML

## 2022-06-20 PROCEDURE — 1090F PRES/ABSN URINE INCON ASSESS: CPT | Performed by: SPECIALIST

## 2022-06-20 PROCEDURE — 99204 OFFICE O/P NEW MOD 45 MIN: CPT | Performed by: SPECIALIST

## 2022-06-20 PROCEDURE — 1123F ACP DISCUSS/DSCN MKR DOCD: CPT | Performed by: SPECIALIST

## 2022-06-20 PROCEDURE — G8427 DOCREV CUR MEDS BY ELIG CLIN: HCPCS | Performed by: SPECIALIST

## 2022-06-20 PROCEDURE — G8400 PT W/DXA NO RESULTS DOC: HCPCS | Performed by: SPECIALIST

## 2022-06-20 PROCEDURE — G8420 CALC BMI NORM PARAMETERS: HCPCS | Performed by: SPECIALIST

## 2022-06-20 PROCEDURE — 1036F TOBACCO NON-USER: CPT | Performed by: SPECIALIST

## 2022-06-20 NOTE — PROGRESS NOTES
Gastroenterology  Note  Jose Ramon Lau. Lan Kulkarni MD      Subjective:      Patient ID:      Dimitri Boston                 1940    CC: increased GI symptoms    HPI:     complained of gas, bloating and nausea. Her   last fall and she has not been following her usual diet- including dairy products 9 and previously though to have lactose intolerance. Has lost 25 pounds since   but gained 5 back. No abd pain, blood in stool, melena. Bowels are somewhat irregular though no real diarrhea or constipation   Had blood work drawn today at Dr [employer] but not sure what. Her Hb was low in April after knee surgery    ROS: see HPI for positives and pertinent negatives.  All other systems reviewed and are negative    Objective:     PHYSICAL EXAM:    Vitals:  /72 (Site: Right Upper Arm, Position: Sitting, Cuff Size: Medium Adult)   Pulse 66   Temp 97.1 °F (36.2 °C) (Infrared)   Ht 5' (1.524 m)   Wt 109 lb 6.4 oz (49.6 kg)   SpO2 96%   BMI 21.37 kg/m²   CONSTITUTIONAL: alert, cooperative, no apparent distress,   EYES:  pupils equal, round and reactive to light and sclera clear  ENT:  normocepalic, without obvious abnormality  NECK:  supple, symmetrical, trachea midline  HEMATOLOGIC/LYMPHATICS:  no cervical lymphadenopathy and no supraclavicular lymphadenopathy  LUNGS:  clear to auscultation  CARDIOVASCULAR:  regular rate and rhythm and no murmur noted  ABDOMEN:  normal bowel sounds, soft, non-distended, non-tender with no masses or hepatomegaly palpated  NEUROLOGIC: no focal deficit detected  SKIN:  no lesions  EXTREMITIES: no clubbing, cyanosis, or edema     Assessment:     1) history of functional dyspepsia, IBS  2) history lactose intolerance  3) non-specific dyspeptic symptoms- possibly due to diet change and the above    Plan:     1) CBC, CMP, CRP- she will call with which labs she had doene today and will order the rest  2) fecal immunochemical test  3) follow low lactose and FODMAP diet as before  4) if labs abnormal or if sx persist, will investigate further        Ignacio Elizabeth M.D.

## 2022-06-21 ENCOUNTER — HOSPITAL ENCOUNTER (OUTPATIENT)
Age: 82
Setting detail: SPECIMEN
Discharge: HOME | End: 2022-06-21
Payer: MEDICARE

## 2022-06-21 PROCEDURE — 82274 ASSAY TEST FOR BLOOD FECAL: CPT

## 2022-06-23 LAB — OCCULT BLOOD, FECAL, IA: NEGATIVE

## 2022-06-24 ENCOUNTER — HOSPITAL ENCOUNTER (OUTPATIENT)
Age: 82
Discharge: HOME | End: 2022-06-24
Payer: MEDICARE

## 2022-06-24 LAB — C-REACTIVE PROTEIN, HIGH SENSITIVITY: 3 MG/L

## 2022-06-24 PROCEDURE — 86140 C-REACTIVE PROTEIN: CPT

## 2022-06-24 PROCEDURE — 36415 COLL VENOUS BLD VENIPUNCTURE: CPT

## 2022-07-03 PROCEDURE — 93296 REM INTERROG EVL PM/IDS: CPT | Performed by: NURSE PRACTITIONER

## 2022-07-03 PROCEDURE — 93294 REM INTERROG EVL PM/LDLS PM: CPT | Performed by: NURSE PRACTITIONER

## 2022-07-06 ENCOUNTER — PROCEDURE VISIT (OUTPATIENT)
Dept: CARDIOLOGY CLINIC | Age: 82
End: 2022-07-06
Payer: MEDICARE

## 2022-07-06 DIAGNOSIS — I45.9 HEART BLOCK: ICD-10-CM

## 2022-07-06 DIAGNOSIS — Z95.0 CARDIAC PACEMAKER IN SITU: Primary | ICD-10-CM

## 2022-08-02 ENCOUNTER — OFFICE (OUTPATIENT)
Dept: URBAN - METROPOLITAN AREA CLINIC 18 | Facility: CLINIC | Age: 82
End: 2022-08-02
Payer: COMMERCIAL

## 2022-08-02 VITALS
HEIGHT: 60 IN | DIASTOLIC BLOOD PRESSURE: 80 MMHG | WEIGHT: 105 LBS | SYSTOLIC BLOOD PRESSURE: 122 MMHG | HEART RATE: 65 BPM

## 2022-08-02 DIAGNOSIS — K58.9 IRRITABLE BOWEL SYNDROME WITHOUT DIARRHEA: ICD-10-CM

## 2022-08-02 DIAGNOSIS — K21.9 GASTRO-ESOPHAGEAL REFLUX DISEASE WITHOUT ESOPHAGITIS: ICD-10-CM

## 2022-08-02 DIAGNOSIS — R14.0 ABDOMINAL DISTENSION (GASEOUS): ICD-10-CM

## 2022-08-02 PROCEDURE — 99213 OFFICE O/P EST LOW 20 MIN: CPT | Performed by: INTERNAL MEDICINE

## 2022-10-09 PROCEDURE — 93294 REM INTERROG EVL PM/LDLS PM: CPT | Performed by: INTERNAL MEDICINE

## 2022-10-09 PROCEDURE — 93296 REM INTERROG EVL PM/IDS: CPT | Performed by: INTERNAL MEDICINE

## 2022-10-10 ENCOUNTER — PROCEDURE VISIT (OUTPATIENT)
Dept: CARDIOLOGY CLINIC | Age: 82
End: 2022-10-10

## 2022-10-10 ENCOUNTER — TELEPHONE (OUTPATIENT)
Dept: CARDIOLOGY CLINIC | Age: 82
End: 2022-10-10

## 2022-10-10 DIAGNOSIS — I45.9 HEART BLOCK: ICD-10-CM

## 2022-10-10 DIAGNOSIS — Z95.0 CARDIAC PACEMAKER IN SITU: Primary | ICD-10-CM

## 2022-10-10 NOTE — LETTER
Cardiology [address] [facility] [address]  Phone: [phone]  Fax: [phone]    10/10/2022        [address] [address]            Dear Claudell Hood: This is your [employer] schedule. You can inocente your calendar with these dates. Remember that your device is wireless and should automatically do these checks while you are sleeping. If for any reason I do not get your transmission then I will call you and ask that you send a manual transmission. If you have any questions or concerns, please call and ask for [place] at [phone]. Thank you.

## 2022-12-06 ENCOUNTER — OFFICE (OUTPATIENT)
Dept: URBAN - METROPOLITAN AREA CLINIC 18 | Facility: CLINIC | Age: 82
End: 2022-12-06
Payer: COMMERCIAL

## 2022-12-06 VITALS — WEIGHT: 113.4 LBS | DIASTOLIC BLOOD PRESSURE: 78 MMHG | SYSTOLIC BLOOD PRESSURE: 118 MMHG | HEIGHT: 60 IN

## 2022-12-06 DIAGNOSIS — K58.0 IRRITABLE BOWEL SYNDROME WITH DIARRHEA: ICD-10-CM

## 2022-12-06 PROCEDURE — 99213 OFFICE O/P EST LOW 20 MIN: CPT | Performed by: INTERNAL MEDICINE

## 2022-12-29 ENCOUNTER — OFFICE VISIT (OUTPATIENT)
Dept: CARDIOLOGY CLINIC | Age: 82
End: 2022-12-29
Payer: MEDICARE

## 2022-12-29 VITALS
BODY MASS INDEX: 22.38 KG/M2 | WEIGHT: 114 LBS | SYSTOLIC BLOOD PRESSURE: 150 MMHG | HEIGHT: 60 IN | DIASTOLIC BLOOD PRESSURE: 70 MMHG | HEART RATE: 60 BPM

## 2022-12-29 DIAGNOSIS — Z95.0 PACEMAKER: ICD-10-CM

## 2022-12-29 DIAGNOSIS — I44.7 LBBB (LEFT BUNDLE BRANCH BLOCK): ICD-10-CM

## 2022-12-29 DIAGNOSIS — I10 PRIMARY HYPERTENSION: Primary | ICD-10-CM

## 2022-12-29 DIAGNOSIS — I38 VHD (VALVULAR HEART DISEASE): ICD-10-CM

## 2022-12-29 DIAGNOSIS — I44.2 COMPLETE HEART BLOCK (HCC): ICD-10-CM

## 2022-12-29 PROCEDURE — 99214 OFFICE O/P EST MOD 30 MIN: CPT | Performed by: INTERNAL MEDICINE

## 2022-12-29 PROCEDURE — G8400 PT W/DXA NO RESULTS DOC: HCPCS | Performed by: INTERNAL MEDICINE

## 2022-12-29 PROCEDURE — 3078F DIAST BP <80 MM HG: CPT | Performed by: INTERNAL MEDICINE

## 2022-12-29 PROCEDURE — 1036F TOBACCO NON-USER: CPT | Performed by: INTERNAL MEDICINE

## 2022-12-29 PROCEDURE — G8427 DOCREV CUR MEDS BY ELIG CLIN: HCPCS | Performed by: INTERNAL MEDICINE

## 2022-12-29 PROCEDURE — 1090F PRES/ABSN URINE INCON ASSESS: CPT | Performed by: INTERNAL MEDICINE

## 2022-12-29 PROCEDURE — G8484 FLU IMMUNIZE NO ADMIN: HCPCS | Performed by: INTERNAL MEDICINE

## 2022-12-29 PROCEDURE — 3074F SYST BP LT 130 MM HG: CPT | Performed by: INTERNAL MEDICINE

## 2022-12-29 PROCEDURE — 1123F ACP DISCUSS/DSCN MKR DOCD: CPT | Performed by: INTERNAL MEDICINE

## 2022-12-29 PROCEDURE — G8420 CALC BMI NORM PARAMETERS: HCPCS | Performed by: INTERNAL MEDICINE

## 2022-12-29 RX ORDER — AMLODIPINE BESYLATE 5 MG/1
5 TABLET ORAL DAILY
Qty: 30 TABLET | Refills: 5 | Status: SHIPPED | OUTPATIENT
Start: 2022-12-29

## 2022-12-29 NOTE — PATIENT INSTRUCTIONS
CORONARY ARTERY DISEASE::None known . 3/2020    Most likely normal test. Apical thinning seen is probably physiological.    Normal LV function. LVEF is > 70 %. HTN: Not well controlled on current medical regimen, see list above. - changes in  treatment: Yes, on Atacand & Tenormin      CARDIOMYOPATHY: None known   CONGESTIVE HEART FAILURE: NO KNOWN HISTORY.        VHD: Moderate AR  ECHO 7/2021   Left ventricular function and size is normal, EF is estimated at 55-60%. Mild left ventricular hypertrophy. Normal diastolic filling pattern for age. No regional wall motion abnormalities were detected. PPM wiring visualized within the right side of the heart. Mild mitral, tricuspid ,pulmonic and moderate aortic regurgitation is   present. RVSP is 29 mmHg. No evidence of pericardial effusion. DYSLIPIDEMIA: Patient's profile is not available. ARRHYTHMIAS:  Known H/O CHB S/P PPM  PACEMAKER device check results reviewed & explained to the patient. Still has 7.8 years of battery life left. TESTS ORDERED: none this visit     PREVIOUSLY ORDERED TESTS REVIEWED & DISCUSSED WITH THE PATIENT:     I personally reviewed & interpreted, all previously ordered tests as copied above. Latest Labs are pulled in to the note with dates. Labs, specially in Reference to Lipid profile, Cardiac testing in the form of Echo ( dated: ), stress tests ( dated: ) & other relevant cardiac testing reviewed with patient & recommendations made based on assessment of the results. Discussed role of Cardiac risk factors & effects + treatment of co morbidities with patient & advised accordingly. MEDICATIONS: List of medications patient is currently taking is reviewed in detail with the patient & family member present. Discussed any side effects or problems taking the medication. Recommend Continue present management & medications as listed. Add Amlodipine 5 mg daily to the regimen.     AFFIRMATION: I reviewed patient's history, previous & current medical problems & all Labs + testing. This includes chart prep even prior to the vosit. Various goals are discussed and multiple questions answered. Relevant concelling performed. Office follow up in six months. Device check per protocol.

## 2022-12-29 NOTE — LETTER
[address]  [address] [address]  Phone: [phone]  Fax: [phone]    Rafy Ortiz MD    December 29, 2022     Chas Cuevas, [address] [place] [address]    Patient: Barbara Granados   MR Number: [id number]   YOB: 1940   Date of Visit: 12/29/2022       Dear Chas Cuevas:    Thank you for referring Barbara Granados to me for evaluation/treatment. Below are the relevant portions of my assessment and plan of care. If you have questions, please do not hesitate to call me. I look forward to following Cayden Clayton along with you.     Sincerely,      Rafy Ortiz MD

## 2022-12-29 NOTE — PROGRESS NOTES
OFFICE Dina Gastelum is a 79 y.o. female who has    CHIEF COMPLAINT AS FOLLOWS:  CHEST PAIN: Patient denies any C/O chest pains at this time. SOB: No C/O SOB at this time. LEG EDEMA: No leg edema   PALPITATIONS: Denies any C/O Palpitations   DIZZINESS: No C/O Dizziness   SYNCOPE: None   OTHER/ ADDITIONAL COMPLAINTS:                                     HPI: Patient is here for F/U on her VHD, HTN & Dyslipidemia problems. VHD: Patient has known aortic valve disease. HTN: Patient has known essential HTN. Has been treated with guideline recommended medical / physical/ diet therapy as stated below. Dyslipidemia: Patient has known mixed dyslipidemia. Has been treated with guideline recommended medical / physical/ diet therapy as stated below. Current Outpatient Medications   Medication Sig Dispense Refill    LORazepam (ATIVAN) 1 MG tablet Take 1 mg by mouth every 6 hours as needed for Anxiety. candesartan (ATACAND) 32 MG tablet Take 32 mg by mouth daily      atenolol (TENORMIN) 25 MG tablet Take 1 tablet by mouth daily 90 tablet 3    Probiotic Product (PROBIOTIC DAILY PO) Take  by mouth daily. esomeprazole Magnesium (NEXIUM) 40 MG PACK Take 40 mg by mouth daily. Calcium Carb-Cholecalciferol (CALCIUM 1000 + D PO) Take 1,000 mg by mouth daily. therapeutic multivitamin-minerals (THERAGRAN-M) tablet Take 1 tablet by mouth daily. No current facility-administered medications for this visit.      Allergies: Hydrochlorothiazide, Triamterene, and Vioxx [rofecoxib]  Review of Systems:    Constitutional: Negative for diaphoresis and fatigue  Respiratory: Negative for shortness of breath  Cardiovascular: Negative for chest pain, dyspnea on exertion, claudication, edema, irregular heartbeat, murmur, palpitations or shortness of breath  Musculoskeletal: Negative for muscle pain, muscular weakness, negative for pain in arm and leg or swelling in 12:00 AM    K 4.3 05/09/2018 05:28 AM     06/20/2022 12:00 AM     05/09/2018 05:28 AM    CO2 24 06/20/2022 12:00 AM    CO2 22 05/09/2018 05:28 AM    BUN 13 06/20/2022 12:00 AM    BUN 10 05/09/2018 05:28 AM    CREATININE 0.7 06/20/2022 12:00 AM    CREATININE 0.7 05/09/2018 05:28 AM    PROT 7.5 05/08/2018 03:10 PM    PROT 7.8 01/20/2017 12:15 AM     Lab Results   Component Value Date/Time    TSH 2.190 06/20/2022 12:00 AM    TSHHS 3.450 05/08/2018 03:10 PM       QUALITY MEASURES REVIEWED:  1.CAD:Patient is taking anti platelet agent:No  Patient does not have Hx of documented CAD  2. DYSLIPIDEMIA: Patient is on cholesterol lowering medication:No   3. Beta-Blocker therapy for CAD, if prior Myocardial Infarction:Yes  4. Counselled regarding smoking cessation. No   Patient does not Smoke. 5.Anticoagulation therapy (for A.Fib) No   Does Not have A.Fib.  6.Discussed weight management strategies. Assessment & Plan:  Primary / Secondary prevention is the goal by aggressive risk modification, healthy and therapeutic life style changes for cardiovascular risk reduction. CORONARY ARTERY DISEASE::None known . 3/2020    Most likely normal test. Apical thinning seen is probably physiological.    Normal LV function. LVEF is > 70 %. HTN: Not well controlled on current medical regimen, see list above. - changes in  treatment: Yes, on Atacand & Tenormin      CARDIOMYOPATHY: None known   CONGESTIVE HEART FAILURE: NO KNOWN HISTORY.        VHD: Moderate AR  ECHO 7/2021   Left ventricular function and size is normal, EF is estimated at 55-60%. Mild left ventricular hypertrophy. Normal diastolic filling pattern for age. No regional wall motion abnormalities were detected. PPM wiring visualized within the right side of the heart. Mild mitral, tricuspid ,pulmonic and moderate aortic regurgitation is   present. RVSP is 29 mmHg. No evidence of pericardial effusion.       DYSLIPIDEMIA: Patient's profile is not available. ARRHYTHMIAS:  Known H/O CHB S/P PPM  PACEMAKER device check results reviewed & explained to the patient. Still has 7.8 years of battery life left. TESTS ORDERED: none this visit     PREVIOUSLY ORDERED TESTS REVIEWED & DISCUSSED WITH THE PATIENT:     I personally reviewed & interpreted, all previously ordered tests as copied above. Latest Labs are pulled in to the note with dates. Labs, specially in Reference to Lipid profile, Cardiac testing in the form of Echo ( dated: ), stress tests ( dated: ) & other relevant cardiac testing reviewed with patient & recommendations made based on assessment of the results. Discussed role of Cardiac risk factors & effects + treatment of co morbidities with patient & advised accordingly. MEDICATIONS: List of medications patient is currently taking is reviewed in detail with the patient & family member present. Discussed any side effects or problems taking the medication. Recommend Continue present management & medications as listed. Add Amlodipine 5 mg daily to the regimen. AFFIRMATION: I reviewed patient's history, previous & current medical problems & all Labs + testing. This includes chart prep even prior to the vosit. Various goals are discussed and multiple questions answered. Relevant concelling performed. Office follow up in six months. Device check per protocol.

## 2023-01-16 ENCOUNTER — PROCEDURE VISIT (OUTPATIENT)
Dept: CARDIOLOGY CLINIC | Age: 83
End: 2023-01-16

## 2023-01-16 DIAGNOSIS — Z95.0 CARDIAC PACEMAKER IN SITU: ICD-10-CM

## 2023-01-16 DIAGNOSIS — I45.9 HEART BLOCK: ICD-10-CM

## 2023-04-19 ENCOUNTER — OFFICE (OUTPATIENT)
Dept: URBAN - METROPOLITAN AREA CLINIC 18 | Facility: CLINIC | Age: 83
End: 2023-04-19
Payer: COMMERCIAL

## 2023-04-19 VITALS
DIASTOLIC BLOOD PRESSURE: 78 MMHG | WEIGHT: 120 LBS | SYSTOLIC BLOOD PRESSURE: 136 MMHG | HEART RATE: 71 BPM | HEIGHT: 60 IN

## 2023-04-19 DIAGNOSIS — R19.7 DIARRHEA, UNSPECIFIED: ICD-10-CM

## 2023-04-19 DIAGNOSIS — R10.10 UPPER ABDOMINAL PAIN, UNSPECIFIED: ICD-10-CM

## 2023-04-19 PROCEDURE — 99213 OFFICE O/P EST LOW 20 MIN: CPT | Performed by: INTERNAL MEDICINE

## 2023-04-20 LAB
COMPREHENSIVE METABOLIC PANEL: A/G RATIO: 1.9 RATIO
COMPREHENSIVE METABOLIC PANEL: ALBUMIN: 4.8 G/DL
COMPREHENSIVE METABOLIC PANEL: ALK PHOSPHATASE: 55 U/L
COMPREHENSIVE METABOLIC PANEL: ALT: 8 U/L
COMPREHENSIVE METABOLIC PANEL: AST: 14 U/L
COMPREHENSIVE METABOLIC PANEL: BILIRUBIN,TOTAL: 0.3 MG/DL
COMPREHENSIVE METABOLIC PANEL: BLOOD UREA NITROGEN: 13 MG/DL
COMPREHENSIVE METABOLIC PANEL: BUN/CREAT RATIO: 16
COMPREHENSIVE METABOLIC PANEL: CALCIUM: 10.3 MG/DL
COMPREHENSIVE METABOLIC PANEL: CHLORIDE: 102 MEQ/L
COMPREHENSIVE METABOLIC PANEL: CO2: 25 MEQ/L
COMPREHENSIVE METABOLIC PANEL: CREATININE: 0.8 MG/DL
COMPREHENSIVE METABOLIC PANEL: FASTING STATUS: (no result)
COMPREHENSIVE METABOLIC PANEL: GLOBULIN: 2.5 G/DL
COMPREHENSIVE METABOLIC PANEL: GLOMERULAR FILTRATION RATE (GFR): 73 MLS/MIN/1.73M2
COMPREHENSIVE METABOLIC PANEL: GLUCOSE,RANDOM: 91 MG/DL
COMPREHENSIVE METABOLIC PANEL: POTASSIUM: 4.2 MEQ/L
COMPREHENSIVE METABOLIC PANEL: SODIUM: 138 MEQ/L
COMPREHENSIVE METABOLIC PANEL: TOTAL PROTEIN: 7.3 G/DL
LIPASE: 27 U/L

## 2023-04-24 ENCOUNTER — PROCEDURE VISIT (OUTPATIENT)
Dept: CARDIOLOGY CLINIC | Age: 83
End: 2023-04-24

## 2023-04-24 DIAGNOSIS — I45.9 HEART BLOCK: ICD-10-CM

## 2023-04-24 DIAGNOSIS — Z95.0 CARDIAC PACEMAKER IN SITU: Primary | ICD-10-CM

## 2023-05-26 RX ORDER — AMLODIPINE BESYLATE 5 MG/1
5 TABLET ORAL DAILY
Qty: 90 TABLET | Refills: 3 | Status: SHIPPED | OUTPATIENT
Start: 2023-05-26

## 2023-06-06 ENCOUNTER — OFFICE (OUTPATIENT)
Dept: URBAN - METROPOLITAN AREA CLINIC 18 | Facility: CLINIC | Age: 83
End: 2023-06-06

## 2023-06-06 VITALS
HEART RATE: 60 BPM | DIASTOLIC BLOOD PRESSURE: 76 MMHG | HEIGHT: 60 IN | SYSTOLIC BLOOD PRESSURE: 118 MMHG | WEIGHT: 110 LBS

## 2023-06-06 DIAGNOSIS — K21.9 GASTRO-ESOPHAGEAL REFLUX DISEASE WITHOUT ESOPHAGITIS: ICD-10-CM

## 2023-06-06 DIAGNOSIS — K76.0 FATTY (CHANGE OF) LIVER, NOT ELSEWHERE CLASSIFIED: ICD-10-CM

## 2023-06-06 DIAGNOSIS — K58.0 IRRITABLE BOWEL SYNDROME WITH DIARRHEA: ICD-10-CM

## 2023-06-06 PROCEDURE — 99213 OFFICE O/P EST LOW 20 MIN: CPT | Performed by: INTERNAL MEDICINE

## 2023-06-28 RX ORDER — AMLODIPINE BESYLATE 5 MG/1
5 TABLET ORAL DAILY
Qty: 90 TABLET | Refills: 3 | Status: SHIPPED | OUTPATIENT
Start: 2023-06-28

## 2023-07-05 DIAGNOSIS — I44.2 COMPLETE HEART BLOCK (HCC): ICD-10-CM

## 2023-07-05 DIAGNOSIS — I38 VHD (VALVULAR HEART DISEASE): ICD-10-CM

## 2023-07-05 DIAGNOSIS — I10 ESSENTIAL HYPERTENSION: ICD-10-CM

## 2023-07-05 DIAGNOSIS — Z82.49 FAMILY HISTORY OF CORONARY ARTERY DISEASE: ICD-10-CM

## 2023-07-05 DIAGNOSIS — I10 PRIMARY HYPERTENSION: Primary | ICD-10-CM

## 2023-07-07 ENCOUNTER — OFFICE VISIT (OUTPATIENT)
Dept: CARDIOLOGY CLINIC | Age: 83
End: 2023-07-07
Payer: MEDICARE

## 2023-07-07 VITALS
HEIGHT: 60 IN | WEIGHT: 113 LBS | SYSTOLIC BLOOD PRESSURE: 136 MMHG | BODY MASS INDEX: 22.19 KG/M2 | DIASTOLIC BLOOD PRESSURE: 60 MMHG | HEART RATE: 64 BPM

## 2023-07-07 DIAGNOSIS — I10 PRIMARY HYPERTENSION: ICD-10-CM

## 2023-07-07 DIAGNOSIS — R00.1 BRADYCARDIA: ICD-10-CM

## 2023-07-07 DIAGNOSIS — Z95.0 PACEMAKER: ICD-10-CM

## 2023-07-07 DIAGNOSIS — I44.2 COMPLETE HEART BLOCK (HCC): Primary | ICD-10-CM

## 2023-07-07 DIAGNOSIS — I38 VHD (VALVULAR HEART DISEASE): ICD-10-CM

## 2023-07-07 DIAGNOSIS — I44.7 LBBB (LEFT BUNDLE BRANCH BLOCK): ICD-10-CM

## 2023-07-07 PROBLEM — I45.9 HEART BLOCK: Status: RESOLVED | Noted: 2018-05-08 | Resolved: 2023-07-07

## 2023-07-07 PROCEDURE — 1090F PRES/ABSN URINE INCON ASSESS: CPT | Performed by: INTERNAL MEDICINE

## 2023-07-07 PROCEDURE — 1036F TOBACCO NON-USER: CPT | Performed by: INTERNAL MEDICINE

## 2023-07-07 PROCEDURE — G8400 PT W/DXA NO RESULTS DOC: HCPCS | Performed by: INTERNAL MEDICINE

## 2023-07-07 PROCEDURE — G8420 CALC BMI NORM PARAMETERS: HCPCS | Performed by: INTERNAL MEDICINE

## 2023-07-07 PROCEDURE — 3075F SYST BP GE 130 - 139MM HG: CPT | Performed by: INTERNAL MEDICINE

## 2023-07-07 PROCEDURE — G8427 DOCREV CUR MEDS BY ELIG CLIN: HCPCS | Performed by: INTERNAL MEDICINE

## 2023-07-07 PROCEDURE — 3078F DIAST BP <80 MM HG: CPT | Performed by: INTERNAL MEDICINE

## 2023-07-07 PROCEDURE — 1123F ACP DISCUSS/DSCN MKR DOCD: CPT | Performed by: INTERNAL MEDICINE

## 2023-07-07 PROCEDURE — 99213 OFFICE O/P EST LOW 20 MIN: CPT | Performed by: INTERNAL MEDICINE

## 2023-07-07 NOTE — PATIENT INSTRUCTIONS
CORONARY ARTERY DISEASE:None known . 3/2020    Most likely normal test. Apical thinning seen is probably physiological.    Normal LV function. LVEF is > 70 %. HTN: Not well controlled on current medical regimen, see list above. - changes in  treatment: Yes, on Atacand & Tenormin      CARDIOMYOPATHY: None known   CONGESTIVE HEART FAILURE: NO KNOWN HISTORY.        VHD: Moderate AR  ECHO 7/2021   Left ventricular function and size is normal, EF is estimated at 55-60%. Mild left ventricular hypertrophy. Normal diastolic filling pattern for age. No regional wall motion abnormalities were detected. PPM wiring visualized within the right side of the heart. Mild mitral, tricuspid ,pulmonic and moderate aortic regurgitation is   present. RVSP is 29 mmHg. No evidence of pericardial effusion. DYSLIPIDEMIA: Patient's profile is not available. ARRHYTHMIAS:  Known H/O CHB S/P PPM    PACEMAKER device check results reviewed & explained to the patient. Still has 7.6 years of battery life left. TESTS ORDERED: Echo     PREVIOUSLY ORDERED TESTS REVIEWED & DISCUSSED WITH THE PATIENT:     I personally reviewed & interpreted, all previously ordered tests as copied above. Latest Labs are pulled in to the note with dates. Labs, specially in Reference to Lipid profile, Cardiac testing in the form of Echo ( dated: ), stress tests ( dated: ) & other relevant cardiac testing reviewed with patient & recommendations made based on assessment of the results. Discussed role of Cardiac risk factors & effects + treatment of co morbidities with patient & advised accordingly. MEDICATIONS: List of medications patient is currently taking is reviewed in detail with the patient. Discussed any side effects or problems taking the medication. Recommend Continue present management & medications as listed.      AFFIRMATION: I spent at least 20 minutes of time reviewing patient's history, previous &

## 2023-07-07 NOTE — PROGRESS NOTES
OFFICE Roderick Preciado is a 79 y.o. female who has    CHIEF COMPLAINT AS FOLLOWS:  CHEST PAIN: Patient denies any C/O chest pains at this time. SOB: No C/O SOB at this time. LEG EDEMA: No leg edema   PALPITATIONS: Denies any C/O Palpitations   DIZZINESS: No C/O Dizziness   SYNCOPE: None   OTHER/ ADDITIONAL COMPLAINTS:                                     HPI: Patient is here for F/U on her VHD,  HTN & Dyslipidemia problems. VHD: Patient has known aortic valve disease. Has had valve repair / surgery in the past.  HTN: Patient has known essential HTN. Has been treated with guideline recommended medical / physical/ diet therapy as stated below. Dyslipidemia: Patient has known mixed dyslipidemia. Has been treated with guideline recommended medical / physical/ diet therapy as stated below. Current Outpatient Medications   Medication Sig Dispense Refill    amLODIPine (NORVASC) 5 MG tablet Take 1 tablet by mouth daily 90 tablet 3    LORazepam (ATIVAN) 1 MG tablet Take 1 tablet by mouth every 6 hours as needed for Anxiety. candesartan (ATACAND) 32 MG tablet Take 1 tablet by mouth daily      atenolol (TENORMIN) 25 MG tablet Take 1 tablet by mouth daily 90 tablet 3    Probiotic Product (PROBIOTIC DAILY PO) Take  by mouth daily. esomeprazole Magnesium (NEXIUM) 40 MG PACK Take 1 packet by mouth daily      Calcium Carb-Cholecalciferol (CALCIUM 1000 + D PO) Take 1,000 mg by mouth daily. therapeutic multivitamin-minerals (THERAGRAN-M) tablet Take 1 tablet by mouth daily       No current facility-administered medications for this visit.      Allergies: Hydrochlorothiazide, Triamterene, and Vioxx [rofecoxib]  Review of Systems:    Constitutional: Negative for diaphoresis and fatigue  Respiratory: Negative for shortness of breath  Cardiovascular: Negative for chest pain, dyspnea on exertion, claudication, edema, irregular heartbeat, murmur, palpitations or shortness

## 2023-07-10 LAB
CHOLESTEROL: 192 MG/DL
HDLC SERPL-MCNC: 50 MG/DL
LDL CHOLESTEROL CALCULATED: 109 MG/DL
TRIGL SERPL-MCNC: 163 MG/DL
VLDLC SERPL CALC-MCNC: 33 MG/DL (ref 4–38)

## 2023-07-24 ENCOUNTER — PROCEDURE VISIT (OUTPATIENT)
Dept: CARDIOLOGY CLINIC | Age: 83
End: 2023-07-24
Payer: MEDICARE

## 2023-07-24 DIAGNOSIS — I10 PRIMARY HYPERTENSION: ICD-10-CM

## 2023-07-24 DIAGNOSIS — I38 VHD (VALVULAR HEART DISEASE): ICD-10-CM

## 2023-07-24 DIAGNOSIS — I44.7 LBBB (LEFT BUNDLE BRANCH BLOCK): ICD-10-CM

## 2023-07-24 DIAGNOSIS — Z95.0 PACEMAKER: ICD-10-CM

## 2023-07-24 DIAGNOSIS — I44.2 COMPLETE HEART BLOCK (HCC): ICD-10-CM

## 2023-07-24 DIAGNOSIS — R00.1 BRADYCARDIA: ICD-10-CM

## 2023-07-24 LAB
LV EF: 58 %
LVEF MODALITY: NORMAL

## 2023-07-24 PROCEDURE — 93306 TTE W/DOPPLER COMPLETE: CPT | Performed by: INTERNAL MEDICINE

## 2023-07-27 ENCOUNTER — TELEPHONE (OUTPATIENT)
Dept: CARDIOLOGY CLINIC | Age: 83
End: 2023-07-27

## 2023-07-27 NOTE — TELEPHONE ENCOUNTER
Echo:7/24/2023  Left ventricular function and size is normal, EF is estimated at 55-60%. Mild left ventricular hypertrophy. Normal diastolic filling pattern for age. No regional wall motion abnormalities were detected. PPM wiring visualized within the right side of the heart. Aortic valve leaflets are somewhat thickened. Mild to Moderate aortic regurgitation; PHT: 490msec. Vena Contracta 0.3cm. Mild mitral and tricuspid regurgitation is present. RVSP is 27 mmHg.    No evidence of pericardial effusion    Patient verbally understood

## 2023-07-30 PROCEDURE — 93296 REM INTERROG EVL PM/IDS: CPT | Performed by: INTERNAL MEDICINE

## 2023-07-30 PROCEDURE — 93294 REM INTERROG EVL PM/LDLS PM: CPT | Performed by: INTERNAL MEDICINE

## 2023-07-31 ENCOUNTER — PROCEDURE VISIT (OUTPATIENT)
Dept: CARDIOLOGY CLINIC | Age: 83
End: 2023-07-31
Payer: MEDICARE

## 2023-07-31 DIAGNOSIS — I45.9 HEART BLOCK: ICD-10-CM

## 2023-07-31 DIAGNOSIS — Z95.0 CARDIAC PACEMAKER IN SITU: Primary | ICD-10-CM

## 2023-11-06 ENCOUNTER — HOSPITAL ENCOUNTER (INPATIENT)
Age: 83
LOS: 4 days | Discharge: HOME | End: 2023-11-10
Attending: EMERGENCY MEDICINE
Payer: MEDICARE

## 2023-11-06 ENCOUNTER — APPOINTMENT (OUTPATIENT)
Dept: GENERAL RADIOLOGY | Age: 83
End: 2023-11-06
Payer: MEDICARE

## 2023-11-06 ENCOUNTER — APPOINTMENT (OUTPATIENT)
Dept: CT IMAGING | Age: 83
End: 2023-11-06
Payer: MEDICARE

## 2023-11-06 ENCOUNTER — APPOINTMENT (OUTPATIENT)
Dept: NON INVASIVE DIAGNOSTICS | Age: 83
End: 2023-11-06
Attending: INTERNAL MEDICINE
Payer: MEDICARE

## 2023-11-06 DIAGNOSIS — I31.39 PERICARDIAL EFFUSION: ICD-10-CM

## 2023-11-06 DIAGNOSIS — E87.6 HYPOKALEMIA: ICD-10-CM

## 2023-11-06 DIAGNOSIS — R07.9 CHEST PAIN, UNSPECIFIED TYPE: Primary | ICD-10-CM

## 2023-11-06 DIAGNOSIS — R57.9 SHOCK (HCC): ICD-10-CM

## 2023-11-06 DIAGNOSIS — E83.39 HYPOPHOSPHATEMIA: ICD-10-CM

## 2023-11-06 DIAGNOSIS — D64.9 ANEMIA, UNSPECIFIED TYPE: ICD-10-CM

## 2023-11-06 DIAGNOSIS — I95.9 HYPOTENSION, UNSPECIFIED HYPOTENSION TYPE: ICD-10-CM

## 2023-11-06 PROBLEM — I20.0 UNSTABLE ANGINA PECTORIS (HCC): Status: ACTIVE | Noted: 2023-11-06

## 2023-11-06 LAB
ALBUMIN SERPL-MCNC: 4.3 GM/DL (ref 3.4–5)
ALP BLD-CCNC: 58 IU/L (ref 40–128)
ALT SERPL-CCNC: 7 U/L (ref 10–40)
ANION GAP SERPL CALCULATED.3IONS-SCNC: 10 MMOL/L (ref 4–16)
AST SERPL-CCNC: 14 IU/L (ref 15–37)
B PARAP IS1001 DNA NPH QL NAA+NON-PROBE: NOT DETECTED
B PERT.PT PRMT NPH QL NAA+NON-PROBE: NOT DETECTED
BASOPHILS ABSOLUTE: 0 K/CU MM
BASOPHILS RELATIVE PERCENT: 0.3 % (ref 0–1)
BILIRUB SERPL-MCNC: 0.2 MG/DL (ref 0–1)
BUN SERPL-MCNC: 13 MG/DL (ref 6–23)
C PNEUM DNA NPH QL NAA+NON-PROBE: NOT DETECTED
CALCIUM SERPL-MCNC: 9.7 MG/DL (ref 8.3–10.6)
CHLORIDE BLD-SCNC: 101 MMOL/L (ref 99–110)
CO2: 23 MMOL/L (ref 21–32)
CREAT SERPL-MCNC: 0.6 MG/DL (ref 0.6–1.1)
DIFFERENTIAL TYPE: ABNORMAL
ECHO BSA: 1.47 M2
ECHO LV FRACTIONAL SHORTENING: 26 % (ref 28–44)
ECHO LV INTERNAL DIMENSION DIASTOLE INDEX: 2.12 CM/M2
ECHO LV INTERNAL DIMENSION DIASTOLIC: 3.1 CM (ref 3.9–5.3)
ECHO LV INTERNAL DIMENSION SYSTOLIC INDEX: 1.58 CM/M2
ECHO LV INTERNAL DIMENSION SYSTOLIC: 2.3 CM
ECHO LV IVSD: 1.2 CM (ref 0.6–0.9)
ECHO LV MASS 2D: 106.8 G (ref 67–162)
ECHO LV MASS INDEX 2D: 73.2 G/M2 (ref 43–95)
ECHO LV POSTERIOR WALL DIASTOLIC: 1.1 CM (ref 0.6–0.9)
ECHO LV RELATIVE WALL THICKNESS RATIO: 0.71
EOSINOPHILS ABSOLUTE: 0.3 K/CU MM
EOSINOPHILS RELATIVE PERCENT: 2.7 % (ref 0–3)
FLUAV H1 2009 PAN RNA NPH NAA+NON-PROBE: NOT DETECTED
FLUAV H1 RNA NPH QL NAA+NON-PROBE: NOT DETECTED
FLUAV H3 RNA NPH QL NAA+NON-PROBE: NOT DETECTED
FLUAV RNA NPH QL NAA+NON-PROBE: NOT DETECTED
FLUBV RNA NPH QL NAA+NON-PROBE: NOT DETECTED
GFR SERPL CREATININE-BSD FRML MDRD: >60 ML/MIN/1.73M2
GLUCOSE SERPL-MCNC: 118 MG/DL (ref 70–99)
HADV DNA NPH QL NAA+NON-PROBE: NOT DETECTED
HCOV 229E RNA NPH QL NAA+NON-PROBE: NOT DETECTED
HCOV HKU1 RNA NPH QL NAA+NON-PROBE: NOT DETECTED
HCOV NL63 RNA NPH QL NAA+NON-PROBE: NOT DETECTED
HCOV OC43 RNA NPH QL NAA+NON-PROBE: NOT DETECTED
HCT VFR BLD CALC: 38.9 % (ref 37–47)
HEMOGLOBIN: 12.5 GM/DL (ref 12.5–16)
HMPV RNA NPH QL NAA+NON-PROBE: NOT DETECTED
HPIV1 RNA NPH QL NAA+NON-PROBE: NOT DETECTED
HPIV2 RNA NPH QL NAA+NON-PROBE: NOT DETECTED
HPIV3 RNA NPH QL NAA+NON-PROBE: NOT DETECTED
HPIV4 RNA NPH QL NAA+NON-PROBE: NOT DETECTED
IMMATURE NEUTROPHIL %: 0.3 % (ref 0–0.43)
LACTIC ACID, SEPSIS: 1.4 MMOL/L (ref 0.4–2)
LIPASE: 23 IU/L (ref 13–60)
LYMPHOCYTES ABSOLUTE: 4.2 K/CU MM
LYMPHOCYTES RELATIVE PERCENT: 36.8 % (ref 24–44)
M PNEUMO DNA NPH QL NAA+NON-PROBE: NOT DETECTED
MCH RBC QN AUTO: 31.6 PG (ref 27–31)
MCHC RBC AUTO-ENTMCNC: 32.1 % (ref 32–36)
MCV RBC AUTO: 98.2 FL (ref 78–100)
MONOCYTES ABSOLUTE: 0.7 K/CU MM
MONOCYTES RELATIVE PERCENT: 6.1 % (ref 0–4)
NUCLEATED RBC %: 0 %
PDW BLD-RTO: 13 % (ref 11.7–14.9)
PLATELET # BLD: 361 K/CU MM (ref 140–440)
PMV BLD AUTO: 9.2 FL (ref 7.5–11.1)
POTASSIUM SERPL-SCNC: 3.6 MMOL/L (ref 3.5–5.1)
PRO-BNP: 309.2 PG/ML
PROCALCITONIN SERPL-MCNC: 0.04 NG/ML
RBC # BLD: 3.96 M/CU MM (ref 4.2–5.4)
RSV RNA NPH QL NAA+NON-PROBE: NOT DETECTED
RV+EV RNA NPH QL NAA+NON-PROBE: NOT DETECTED
SARS-COV-2 RNA NPH QL NAA+NON-PROBE: NOT DETECTED
SEGMENTED NEUTROPHILS ABSOLUTE COUNT: 6.1 K/CU MM
SEGMENTED NEUTROPHILS RELATIVE PERCENT: 53.8 % (ref 36–66)
SODIUM BLD-SCNC: 134 MMOL/L (ref 135–145)
TOTAL IMMATURE NEUTOROPHIL: 0.03 K/CU MM
TOTAL NUCLEATED RBC: 0 K/CU MM
TOTAL PROTEIN: 7.2 GM/DL (ref 6.4–8.2)
TROPONIN, HIGH SENSITIVITY: 8 NG/L (ref 0–14)
TROPONIN, HIGH SENSITIVITY: 9 NG/L (ref 0–14)
WBC # BLD: 11.3 K/CU MM (ref 4–10.5)

## 2023-11-06 PROCEDURE — 2500000003 HC RX 250 WO HCPCS: Performed by: EMERGENCY MEDICINE

## 2023-11-06 PROCEDURE — 93325 DOPPLER ECHO COLOR FLOW MAPG: CPT | Performed by: INTERNAL MEDICINE

## 2023-11-06 PROCEDURE — 81001 URINALYSIS AUTO W/SCOPE: CPT

## 2023-11-06 PROCEDURE — 2000000000 HC ICU R&B

## 2023-11-06 PROCEDURE — 6370000000 HC RX 637 (ALT 250 FOR IP): Performed by: EMERGENCY MEDICINE

## 2023-11-06 PROCEDURE — 93308 TTE F-UP OR LMTD: CPT | Performed by: INTERNAL MEDICINE

## 2023-11-06 PROCEDURE — 83605 ASSAY OF LACTIC ACID: CPT

## 2023-11-06 PROCEDURE — 96376 TX/PRO/DX INJ SAME DRUG ADON: CPT

## 2023-11-06 PROCEDURE — 87086 URINE CULTURE/COLONY COUNT: CPT

## 2023-11-06 PROCEDURE — 96365 THER/PROPH/DIAG IV INF INIT: CPT

## 2023-11-06 PROCEDURE — 83690 ASSAY OF LIPASE: CPT

## 2023-11-06 PROCEDURE — 85025 COMPLETE CBC W/AUTO DIFF WBC: CPT

## 2023-11-06 PROCEDURE — 6370000000 HC RX 637 (ALT 250 FOR IP): Performed by: STUDENT IN AN ORGANIZED HEALTH CARE EDUCATION/TRAINING PROGRAM

## 2023-11-06 PROCEDURE — 6360000002 HC RX W HCPCS

## 2023-11-06 PROCEDURE — 93005 ELECTROCARDIOGRAM TRACING: CPT | Performed by: EMERGENCY MEDICINE

## 2023-11-06 PROCEDURE — 0202U NFCT DS 22 TRGT SARS-COV-2: CPT

## 2023-11-06 PROCEDURE — 96374 THER/PROPH/DIAG INJ IV PUSH: CPT

## 2023-11-06 PROCEDURE — 6360000004 HC RX CONTRAST MEDICATION: Performed by: EMERGENCY MEDICINE

## 2023-11-06 PROCEDURE — 6360000002 HC RX W HCPCS: Performed by: EMERGENCY MEDICINE

## 2023-11-06 PROCEDURE — 6360000002 HC RX W HCPCS: Performed by: STUDENT IN AN ORGANIZED HEALTH CARE EDUCATION/TRAINING PROGRAM

## 2023-11-06 PROCEDURE — 2580000003 HC RX 258: Performed by: STUDENT IN AN ORGANIZED HEALTH CARE EDUCATION/TRAINING PROGRAM

## 2023-11-06 PROCEDURE — P9047 ALBUMIN (HUMAN), 25%, 50ML: HCPCS | Performed by: STUDENT IN AN ORGANIZED HEALTH CARE EDUCATION/TRAINING PROGRAM

## 2023-11-06 PROCEDURE — 71045 X-RAY EXAM CHEST 1 VIEW: CPT

## 2023-11-06 PROCEDURE — 99291 CRITICAL CARE FIRST HOUR: CPT | Performed by: INTERNAL MEDICINE

## 2023-11-06 PROCEDURE — 93308 TTE F-UP OR LMTD: CPT

## 2023-11-06 PROCEDURE — 2700000000 HC OXYGEN THERAPY PER DAY

## 2023-11-06 PROCEDURE — 84145 PROCALCITONIN (PCT): CPT

## 2023-11-06 PROCEDURE — 84484 ASSAY OF TROPONIN QUANT: CPT

## 2023-11-06 PROCEDURE — 96375 TX/PRO/DX INJ NEW DRUG ADDON: CPT

## 2023-11-06 PROCEDURE — 2580000003 HC RX 258: Performed by: EMERGENCY MEDICINE

## 2023-11-06 PROCEDURE — 83880 ASSAY OF NATRIURETIC PEPTIDE: CPT

## 2023-11-06 PROCEDURE — 87040 BLOOD CULTURE FOR BACTERIA: CPT

## 2023-11-06 PROCEDURE — 71260 CT THORAX DX C+: CPT

## 2023-11-06 PROCEDURE — 87899 AGENT NOS ASSAY W/OPTIC: CPT

## 2023-11-06 PROCEDURE — 80053 COMPREHEN METABOLIC PANEL: CPT

## 2023-11-06 PROCEDURE — 87449 NOS EACH ORGANISM AG IA: CPT

## 2023-11-06 PROCEDURE — 99285 EMERGENCY DEPT VISIT HI MDM: CPT

## 2023-11-06 RX ORDER — ONDANSETRON 4 MG/1
4 TABLET, ORALLY DISINTEGRATING ORAL EVERY 8 HOURS PRN
Status: DISCONTINUED | OUTPATIENT
Start: 2023-11-06 | End: 2023-11-08

## 2023-11-06 RX ORDER — FENTANYL CITRATE 50 UG/ML
25 INJECTION, SOLUTION INTRAMUSCULAR; INTRAVENOUS ONCE
Status: COMPLETED | OUTPATIENT
Start: 2023-11-06 | End: 2023-11-06

## 2023-11-06 RX ORDER — KETOROLAC TROMETHAMINE 30 MG/ML
INJECTION, SOLUTION INTRAMUSCULAR; INTRAVENOUS
Status: COMPLETED
Start: 2023-11-06 | End: 2023-11-06

## 2023-11-06 RX ORDER — NOREPINEPHRINE BITARTRATE 0.06 MG/ML
1-100 INJECTION, SOLUTION INTRAVENOUS CONTINUOUS
Status: DISCONTINUED | OUTPATIENT
Start: 2023-11-06 | End: 2023-11-07

## 2023-11-06 RX ORDER — ENOXAPARIN SODIUM 100 MG/ML
40 INJECTION SUBCUTANEOUS DAILY
Status: DISCONTINUED | OUTPATIENT
Start: 2023-11-06 | End: 2023-11-06

## 2023-11-06 RX ORDER — SODIUM CHLORIDE 0.9 % (FLUSH) 0.9 %
5-40 SYRINGE (ML) INJECTION PRN
Status: DISCONTINUED | OUTPATIENT
Start: 2023-11-06 | End: 2023-11-10 | Stop reason: HOSPADM

## 2023-11-06 RX ORDER — COLCHICINE 0.6 MG/1
0.6 TABLET ORAL DAILY
Status: DISCONTINUED | OUTPATIENT
Start: 2023-11-06 | End: 2023-11-10 | Stop reason: HOSPADM

## 2023-11-06 RX ORDER — ONDANSETRON 2 MG/ML
4 INJECTION INTRAMUSCULAR; INTRAVENOUS EVERY 6 HOURS PRN
Status: DISCONTINUED | OUTPATIENT
Start: 2023-11-06 | End: 2023-11-08

## 2023-11-06 RX ORDER — ENOXAPARIN SODIUM 100 MG/ML
30 INJECTION SUBCUTANEOUS DAILY
Status: DISCONTINUED | OUTPATIENT
Start: 2023-11-07 | End: 2023-11-10 | Stop reason: HOSPADM

## 2023-11-06 RX ORDER — INDOMETHACIN 25 MG/1
25 CAPSULE ORAL
Status: DISCONTINUED | OUTPATIENT
Start: 2023-11-06 | End: 2023-11-06

## 2023-11-06 RX ORDER — 0.9 % SODIUM CHLORIDE 0.9 %
500 INTRAVENOUS SOLUTION INTRAVENOUS ONCE
Status: COMPLETED | OUTPATIENT
Start: 2023-11-06 | End: 2023-11-06

## 2023-11-06 RX ORDER — POTASSIUM CHLORIDE 29.8 MG/ML
20 INJECTION INTRAVENOUS PRN
Status: DISCONTINUED | OUTPATIENT
Start: 2023-11-06 | End: 2023-11-08

## 2023-11-06 RX ORDER — KETOROLAC TROMETHAMINE 30 MG/ML
15 INJECTION, SOLUTION INTRAMUSCULAR; INTRAVENOUS ONCE
Status: COMPLETED | OUTPATIENT
Start: 2023-11-06 | End: 2023-11-06

## 2023-11-06 RX ORDER — MAGNESIUM SULFATE IN WATER 40 MG/ML
2000 INJECTION, SOLUTION INTRAVENOUS PRN
Status: DISCONTINUED | OUTPATIENT
Start: 2023-11-06 | End: 2023-11-08

## 2023-11-06 RX ORDER — ACETAMINOPHEN 325 MG/1
650 TABLET ORAL EVERY 6 HOURS PRN
Status: DISCONTINUED | OUTPATIENT
Start: 2023-11-06 | End: 2023-11-10 | Stop reason: HOSPADM

## 2023-11-06 RX ORDER — ALBUMIN (HUMAN) 12.5 G/50ML
25 SOLUTION INTRAVENOUS ONCE
Status: COMPLETED | OUTPATIENT
Start: 2023-11-06 | End: 2023-11-06

## 2023-11-06 RX ORDER — 0.9 % SODIUM CHLORIDE 0.9 %
1000 INTRAVENOUS SOLUTION INTRAVENOUS ONCE
Status: COMPLETED | OUTPATIENT
Start: 2023-11-06 | End: 2023-11-06

## 2023-11-06 RX ORDER — ONDANSETRON 2 MG/ML
4 INJECTION INTRAMUSCULAR; INTRAVENOUS EVERY 30 MIN PRN
Status: COMPLETED | OUTPATIENT
Start: 2023-11-06 | End: 2023-11-09

## 2023-11-06 RX ORDER — SODIUM CHLORIDE 9 MG/ML
INJECTION, SOLUTION INTRAVENOUS CONTINUOUS
Status: DISCONTINUED | OUTPATIENT
Start: 2023-11-06 | End: 2023-11-07 | Stop reason: ALTCHOICE

## 2023-11-06 RX ORDER — POLYETHYLENE GLYCOL 3350 17 G/17G
17 POWDER, FOR SOLUTION ORAL DAILY PRN
Status: DISCONTINUED | OUTPATIENT
Start: 2023-11-06 | End: 2023-11-10 | Stop reason: HOSPADM

## 2023-11-06 RX ORDER — MORPHINE SULFATE 2 MG/ML
2 INJECTION, SOLUTION INTRAMUSCULAR; INTRAVENOUS
Status: DISCONTINUED | OUTPATIENT
Start: 2023-11-06 | End: 2023-11-08

## 2023-11-06 RX ORDER — POTASSIUM CHLORIDE 7.45 MG/ML
10 INJECTION INTRAVENOUS PRN
Status: DISCONTINUED | OUTPATIENT
Start: 2023-11-06 | End: 2023-11-08

## 2023-11-06 RX ORDER — KETOROLAC TROMETHAMINE 30 MG/ML
15 INJECTION, SOLUTION INTRAMUSCULAR; INTRAVENOUS EVERY 6 HOURS
Status: DISCONTINUED | OUTPATIENT
Start: 2023-11-06 | End: 2023-11-09

## 2023-11-06 RX ORDER — MORPHINE SULFATE 2 MG/ML
INJECTION, SOLUTION INTRAMUSCULAR; INTRAVENOUS
Status: COMPLETED
Start: 2023-11-06 | End: 2023-11-06

## 2023-11-06 RX ORDER — HYDROCODONE BITARTRATE AND ACETAMINOPHEN 5; 325 MG/1; MG/1
1 TABLET ORAL ONCE
Status: COMPLETED | OUTPATIENT
Start: 2023-11-06 | End: 2023-11-06

## 2023-11-06 RX ORDER — MORPHINE SULFATE 4 MG/ML
4 INJECTION, SOLUTION INTRAMUSCULAR; INTRAVENOUS ONCE
Status: DISCONTINUED | OUTPATIENT
Start: 2023-11-06 | End: 2023-11-06

## 2023-11-06 RX ORDER — MORPHINE SULFATE 2 MG/ML
2 INJECTION, SOLUTION INTRAMUSCULAR; INTRAVENOUS ONCE
Status: COMPLETED | OUTPATIENT
Start: 2023-11-06 | End: 2023-11-06

## 2023-11-06 RX ORDER — SODIUM CHLORIDE 9 MG/ML
INJECTION, SOLUTION INTRAVENOUS PRN
Status: DISCONTINUED | OUTPATIENT
Start: 2023-11-06 | End: 2023-11-10 | Stop reason: HOSPADM

## 2023-11-06 RX ORDER — PANTOPRAZOLE SODIUM 40 MG/1
40 TABLET, DELAYED RELEASE ORAL
Status: DISCONTINUED | OUTPATIENT
Start: 2023-11-06 | End: 2023-11-10 | Stop reason: HOSPADM

## 2023-11-06 RX ORDER — IBUPROFEN 400 MG/1
400 TABLET ORAL EVERY 6 HOURS PRN
Status: DISCONTINUED | OUTPATIENT
Start: 2023-11-06 | End: 2023-11-06

## 2023-11-06 RX ORDER — SODIUM CHLORIDE 0.9 % (FLUSH) 0.9 %
5-40 SYRINGE (ML) INJECTION EVERY 12 HOURS SCHEDULED
Status: DISCONTINUED | OUTPATIENT
Start: 2023-11-06 | End: 2023-11-10 | Stop reason: HOSPADM

## 2023-11-06 RX ORDER — ONDANSETRON 2 MG/ML
4 INJECTION INTRAMUSCULAR; INTRAVENOUS ONCE
Status: DISCONTINUED | OUTPATIENT
Start: 2023-11-06 | End: 2023-11-06

## 2023-11-06 RX ADMIN — MORPHINE SULFATE 2 MG: 2 INJECTION, SOLUTION INTRAMUSCULAR; INTRAVENOUS at 16:24

## 2023-11-06 RX ADMIN — KETOROLAC TROMETHAMINE 15 MG: 30 INJECTION, SOLUTION INTRAMUSCULAR; INTRAVENOUS at 11:12

## 2023-11-06 RX ADMIN — Medication 5 MCG/MIN: at 06:42

## 2023-11-06 RX ADMIN — ONDANSETRON 4 MG: 2 INJECTION INTRAMUSCULAR; INTRAVENOUS at 07:53

## 2023-11-06 RX ADMIN — SODIUM CHLORIDE: 9 INJECTION, SOLUTION INTRAVENOUS at 20:40

## 2023-11-06 RX ADMIN — ACETAMINOPHEN 650 MG: 325 TABLET ORAL at 13:45

## 2023-11-06 RX ADMIN — KETOROLAC TROMETHAMINE 15 MG: 30 INJECTION, SOLUTION INTRAMUSCULAR; INTRAVENOUS at 19:00

## 2023-11-06 RX ADMIN — SODIUM CHLORIDE: 9 INJECTION, SOLUTION INTRAVENOUS at 08:56

## 2023-11-06 RX ADMIN — SODIUM CHLORIDE 1000 ML: 9 INJECTION, SOLUTION INTRAVENOUS at 06:04

## 2023-11-06 RX ADMIN — IOPAMIDOL 75 ML: 755 INJECTION, SOLUTION INTRAVENOUS at 07:24

## 2023-11-06 RX ADMIN — VANCOMYCIN HYDROCHLORIDE 1250 MG: 1.25 INJECTION, POWDER, LYOPHILIZED, FOR SOLUTION INTRAVENOUS at 09:17

## 2023-11-06 RX ADMIN — SODIUM CHLORIDE, PRESERVATIVE FREE 10 ML: 5 INJECTION INTRAVENOUS at 20:43

## 2023-11-06 RX ADMIN — IBUPROFEN 400 MG: 400 TABLET, FILM COATED ORAL at 14:56

## 2023-11-06 RX ADMIN — PANTOPRAZOLE SODIUM 40 MG: 40 TABLET, DELAYED RELEASE ORAL at 13:45

## 2023-11-06 RX ADMIN — FENTANYL CITRATE 25 MCG: 50 INJECTION INTRAMUSCULAR; INTRAVENOUS at 07:53

## 2023-11-06 RX ADMIN — KETOROLAC TROMETHAMINE 15 MG: 30 INJECTION INTRAMUSCULAR; INTRAVENOUS at 19:00

## 2023-11-06 RX ADMIN — SODIUM CHLORIDE: 9 INJECTION, SOLUTION INTRAVENOUS at 14:33

## 2023-11-06 RX ADMIN — SODIUM CHLORIDE 500 ML: 9 INJECTION, SOLUTION INTRAVENOUS at 08:11

## 2023-11-06 RX ADMIN — FENTANYL CITRATE 25 MCG: 50 INJECTION INTRAMUSCULAR; INTRAVENOUS at 06:52

## 2023-11-06 RX ADMIN — PIPERACILLIN AND TAZOBACTAM 4500 MG: 4; .5 INJECTION, POWDER, FOR SOLUTION INTRAVENOUS at 08:28

## 2023-11-06 RX ADMIN — FENTANYL CITRATE 25 MCG: 50 INJECTION INTRAMUSCULAR; INTRAVENOUS at 06:03

## 2023-11-06 RX ADMIN — COLCHICINE 0.6 MG: 0.6 TABLET ORAL at 18:43

## 2023-11-06 RX ADMIN — INDOMETHACIN 25 MG: 25 CAPSULE ORAL at 18:43

## 2023-11-06 RX ADMIN — ONDANSETRON 4 MG: 2 INJECTION INTRAMUSCULAR; INTRAVENOUS at 16:29

## 2023-11-06 RX ADMIN — SODIUM CHLORIDE: 9 INJECTION, SOLUTION INTRAVENOUS at 22:35

## 2023-11-06 RX ADMIN — ONDANSETRON 4 MG: 2 INJECTION INTRAMUSCULAR; INTRAVENOUS at 22:28

## 2023-11-06 RX ADMIN — HYDROCODONE BITARTRATE AND ACETAMINOPHEN 1 TABLET: 5; 325 TABLET ORAL at 09:17

## 2023-11-06 RX ADMIN — ALBUMIN (HUMAN) 25 G: 0.25 INJECTION, SOLUTION INTRAVENOUS at 13:52

## 2023-11-06 RX ADMIN — CEFTRIAXONE 1000 MG: 1 INJECTION, POWDER, FOR SOLUTION INTRAMUSCULAR; INTRAVENOUS at 20:42

## 2023-11-06 RX ADMIN — SODIUM CHLORIDE, PRESERVATIVE FREE 10 ML: 5 INJECTION INTRAVENOUS at 11:16

## 2023-11-06 RX ADMIN — MORPHINE SULFATE 2 MG: 2 INJECTION, SOLUTION INTRAMUSCULAR; INTRAVENOUS at 22:28

## 2023-11-06 RX ADMIN — AZITHROMYCIN MONOHYDRATE 500 MG: 500 INJECTION, POWDER, LYOPHILIZED, FOR SOLUTION INTRAVENOUS at 21:22

## 2023-11-06 ASSESSMENT — PAIN DESCRIPTION - ORIENTATION
ORIENTATION: MID;LEFT;RIGHT
ORIENTATION: MID
ORIENTATION: MID;LEFT;RIGHT

## 2023-11-06 ASSESSMENT — PAIN SCALES - GENERAL
PAINLEVEL_OUTOF10: 2
PAINLEVEL_OUTOF10: 7
PAINLEVEL_OUTOF10: 6
PAINLEVEL_OUTOF10: 3
PAINLEVEL_OUTOF10: 10
PAINLEVEL_OUTOF10: 2
PAINLEVEL_OUTOF10: 8

## 2023-11-06 ASSESSMENT — PAIN DESCRIPTION - DESCRIPTORS
DESCRIPTORS: ACHING
DESCRIPTORS: ACHING;SHARP
DESCRIPTORS: ACHING

## 2023-11-06 ASSESSMENT — PAIN - FUNCTIONAL ASSESSMENT
PAIN_FUNCTIONAL_ASSESSMENT: ACTIVITIES ARE NOT PREVENTED
PAIN_FUNCTIONAL_ASSESSMENT: ACTIVITIES ARE NOT PREVENTED
PAIN_FUNCTIONAL_ASSESSMENT: PREVENTS OR INTERFERES SOME ACTIVE ACTIVITIES AND ADLS

## 2023-11-06 ASSESSMENT — PAIN DESCRIPTION - LOCATION
LOCATION: CHEST;RIB CAGE
LOCATION: CHEST;RIB CAGE
LOCATION: CHEST

## 2023-11-06 NOTE — ED NOTES
Medication History  [facility]    Patient Name: Luz Santoyo 1940     Medication history has been completed by: Luis Enrique Young CPhT    Source(s) of information: patient and insurance claims    Primary Care Physician: Du Carey MD     Pharmacy: [facility]    Allergies as of 11/06/2023 - Fully Reviewed 11/06/2023   Allergen Reaction Noted    Hydrochlorothiazide  08/23/2013    Triamterene  08/23/2013    Vioxx [rofecoxib]  02/15/2014        Prior to Admission medications    Medication Sig Start Date End Date Taking? Authorizing Provider   amLODIPine (NORVASC) 5 MG tablet Take 1 tablet by mouth daily 6/28/23   David Little MD   candesartan (ATACAND) 32 MG tablet Take 1 tablet by mouth daily    Chaitanya Frausto MD   atenolol (TENORMIN) 25 MG tablet Take 1 tablet by mouth daily 6/8/18   David Little MD   Probiotic Product (PROBIOTIC DAILY PO) Take  by mouth daily. Chaitanya Frausto MD   esomeprazole (NEXIUM) 40 MG delayed release capsule Take 1 capsule by mouth daily    Chaitanya Frausto MD   Calcium Carb-Cholecalciferol (CALCIUM 1000 + D PO) Take 1,000 mg by mouth daily. Chaitanya Frausto MD   therapeutic multivitamin-minerals (THERAGRAN-M) tablet Take 1 tablet by mouth daily    Chaitanya Frausto MD     Medications removed from list (include reason, ex. noncompliance, medication cost, therapy complete etc.):   Lorazepam not taking    Comments:  Medication list reviewed with patient and insurance claims verified. No medications taken prior to ER visit.     To my knowledge the above medication history is accurate as of 11/6/2023 8:40 AM.   Luis Enrique Young CPhT   11/6/2023 8:40 AM

## 2023-11-06 NOTE — ED PROVIDER NOTES
performed unless otherwise noted below     Procedures        FINAL IMPRESSION      1. Chest pain, unspecified type    2. Hypotension, unspecified hypotension type    3. Shock [facility])          DISPOSITION/PLAN   DISPOSITION        PATIENT REFERRED TO:  José Miguel Hinton MD  [address] [place] [address]  [phone]    Schedule an appointment as soon as possible for a visit in 1 week        DISCHARGE MEDICATIONS:  New Prescriptions    No medications on file       ED Provider Disposition Time  DISPOSITION        Appropriate personal protective equipment was worn during the patient's evaluation. These included surgical, eye protection, surgical mask or in 95 respirator and gloves. The patient was also placed in a surgical mask for the prevention of possible spread of respiratory viral illnesses. The Patient was instructed to read the package inserts with any medication that was prescribed. Major potential reactions and medication interactions were discussed. The Patient understands that there are numerous possible adverse reactions not covered. The patient was also instructed to arrange follow-up with his or her primary care provider for review of any pending labwork or incidental findings on any radiology results that were obtained. All efforts were made to discuss any incidental findings that require further monitoring.       Controlled Substances Monitoring:          No data to display                (Please note that portions of this note were completed with a voice recognition program.  Efforts were made to edit the dictations but occasionally words are mis-transcribed.)    Avis Tidwell MD (electronically signed)  Attending Emergency Physician           Kaila Lewis, Diane Belle MD  11/06/23 [id number] [facility], Diane Belle MD  11/06/23 7500

## 2023-11-06 NOTE — H&P
V2.0  History and Physical      Name:  Roselyn Anthony /Age/Sex: 1940  (79 y.o. female)   MRN & CSN:  [id number] & [id number] Encounter Date/Time: 2023 6:28 PM EST   Location:  -A PCP: Barbie Martinez MD       Hospital Day: 1    Assessment and Plan:   Roselyn Anthony is a 79 y.o. female with a pmh of PPM who presents with chest pain    Hospital Problems             Last Modified POA    * (Principal) Hypotension 2023 Yes    Unstable angina pectoris ([address]) 2023 Yes   Chest pain  Shock  Concern for PNA  Pericardial effusion  Concern for pericariditis  Hx of PPM    Plan:  Neuro - pain control with NSAIDS. CV - on low dose levo. Unclear if from sepsis vs iatrogenic from fentanyl. Lactate < 2. Wean to MAP > 65. Normotensive at home per records. Will give albumin  Chest pain - neg trops x2. Concern for pericarditis. Will treat with standing nsaids and colchicine for now. PPM to be interrogated  ECHO pending  CTA chest neg for dissection    Resp - on 2L NC    GI - diet as tolerated. GI ppx while on nsaids    Gu - no active issues. Monitor UOP and lytes    ID - concern for PNA. RPP negative. Infiltrates on CT. afebrile with mild leukocytosis. Started on CAP coverage for now. Heme - lovenox    I have performed 55 minutes of critical care time, excluding and separately billable procedures      Disposition:   Current Living situation: home  Expected Disposition: tbd  Estimated D/C: tbd    Diet ADULT DIET;  Regular   DVT Prophylaxis [x] Lovenox, []  Heparin, [] SCDs, [] Ambulation,  [] Eliquis, [] Xarelto, [] Coumadin   Code Status Full Code   Surrogate Decision Maker/ POA tbd     Personally reviewed Lab Studies and Imaging     Discussed management of the case with Dr. Mame Hamilton           History from:     patient, electronic medical record    History of Present Illness:     Chief Complaint: chest pain across diaphragm  Roselyn Anthony is a 79 y.o. female with

## 2023-11-06 NOTE — PROGRESS NOTES
Pt c/o pain across chest at diaphragm level--describes as aching with sharp pain with deep breaths and when moving. Notified Dr. Ciara Segura and order received for IV Toradol one time dose.

## 2023-11-06 NOTE — ED NOTES
Pt reports increase in chest pain. Remains pale in color. Dr Dubon  made aware.       Maritza Salinas RN  11/06/23 0018

## 2023-11-06 NOTE — ED NOTES
ED TO INPATIENT SBAR HANDOFF    Patient Name: Dominik Munguia   :  1940  79 y.o. Preferred Name  Carmelo Ennis   Family/Caregiver Present yes   Restraints no   C-SSRS: Risk of Suicide: No Risk  Sitter no   Sepsis Risk Score Sepsis Risk Score: 0.97      Situation  Chief Complaint   Patient presents with    Chest Pain     4 hours       Brief Description of Patient's Condition: pt reports waking up in the middle of the night to use bathroom. Reports chest pain across chest. Driving herself here to hospital. Per report pt pressure dropping into the 30s and levophed started. Pt stable at this time, managing chest pain. Was seen by cardiology this morning and echo completed. Lives at home alone. A/O  Mental Status: oriented, alert, coherent, logical, and thought processes intact  Arrived from: home    Imaging:   CT CHEST DISSECTION W CONTRAST   Final Result   No CT angiographic evidence of acute dissection, aneurysm, significant   stenosis in thoracic or abdominal aorta, iliac or visualized femoral arterial   distribution, mesenteric or renal branches of the abdominal aorta. Small-moderate size pericardial effusion of uncertain hemodynamic   significance. Differential considerations include pericarditis. Consolidating infiltrate mid-lower lung fields bilaterally consistent with   pneumonia. Findings may be accentuated by underlying chronic lung disease. Multilevel advanced degenerative change throughout the lumbar spine with   45-50 degrees levo rotation, multi level foraminal stenosis and nerve   compression noted. Degenerative change with multilevel disc space narrowing,   foraminal stenosis and nerve effacement/compression also noted visualized   lower cervical spine.       Comment: Findings of pericardial effusion and bilateral pulmonary   consolidating infiltrates discussed with ER attending Dr. Gina Watters at [phone] hours   on 2023         XR CHEST PORTABLE   Final Result   Stable

## 2023-11-06 NOTE — CONSULTS
CARDIOLOGY CONSULT NOTE    Reason for consultation: Chest pain     Referring physician: No admitting provider for patient encounter. Primary care physician: Yarely Troy MD        Dear No admitting provider for patient encounter. Thanks for the consult. History of present illness:Noemi is a 79 y. o.year old who  presents with  chest pain for ONE DAY,, intermittent for 15 to 20 mins and aggravated with activity substernal also,not reproducible with palpation, radiated to shoulder, 6/10,non tender to touch,associated with shortness of breath, + sweating, nausea, IN ED BLOOD PRESSURE was low and she required levophed drip  No fever, no chills, no nausea no vomiting. Blood pressure, cholesterol, blood glucose and weight are well controlled. Chief Complaint   Patient presents with    Chest Pain     4 hours         Past medical history:    has a past medical history of Arthritis, Family history of coronary artery disease, H/O echocardiogram, Hernia, hiatal, History of cardiovascular stress test, History of complete electrocardiogram, History of echocardiogram, History of Holter monitoring, History of nuclear stress test, History of nuclear stress test, Hx of echocardiogram, Hypertension, IBS (irritable bowel syndrome), and LBBB (left bundle branch block). Past surgical history:   has a past surgical history that includes Appendectomy (1952); Breast biopsy (1972 & 1984); Hysterectomy (1982); sinus surgery (1985); knee surgery (2005); Bladder surgery (3/09); and Pacemaker insertion (Left, 05/09/2018). Social History:   reports that she has never smoked. She has never used smokeless tobacco. She reports current alcohol use. She reports that she does not use drugs.   Family history:   no family history of CAD, STROKE of DM    norepinephrine (LEVOPHED) 16 mg in sodium chloride 0.9 % 250 mL infusion, Continuous      Current Facility-Administered Medications   Medication Dose Route Frequency Provider Last

## 2023-11-06 NOTE — ED NOTES
0455: Pt ambulatory to bathroom independently with no issues. 3377: Pt hooked back up to tele monitors. Pt states she is feeling dizzy. BP checked and it is 68/41 MAP 51. Dr. Frank Garcia made aware, fluids ordered. 0502: Pt responsive, but pale and clammy. Pt having a hard time keeping her eyes open and stating she is feeling much worse. BP recycled and getting 38/20 MAP 27. Dr. Frank Garcia called to room. Pt placed in trendelenburg, fluids put on a pressure bag.     0515: Pacer Interrogated    0522: Pt moved to Trauma 1.  Care transferred to [address]  11/06/23 9491

## 2023-11-06 NOTE — PROGRESS NOTES
4 Eyes Skin Assessment     NAME:  Bhargav Hyde  YOB: 1940  MEDICAL RECORD NUMBER:  [id number]    The patient is being assessed for  Admission    I agree that at least one RN has performed a thorough Head to Toe Skin Assessment on the patient. ALL assessment sites listed below have been assessed. Areas assessed by both nurses:    Head, Face, Ears, Shoulders, Back, Chest, Arms, Elbows, Hands, Sacrum. Buttock, Coccyx, Ischium, Legs. Feet and Heels, and Under Medical Devices         Does the Patient have a Wound?  No noted wound(s)       Mac Prevention initiated by RN: No  Wound Care Orders initiated by RN: No    Pressure Injury (Stage 3,4, Unstageable, DTI, NWPT, and Complex wounds) if present, place Wound referral order by RN under : No    New Ostomies, if present place, Ostomy referral order under : No     Nurse 1 eSignature: Electronically signed by Teagan Oglesby RN on 11/6/23 at 3:15 PM EST    **SHARE this note so that the co-signing nurse can place an eSignature**    Nurse 2 eSignature: Electronically signed by Kennedi Harding RN on 11/6/23 at 3:16 PM EST

## 2023-11-06 NOTE — ED PROVIDER NOTES
Emergency Department Encounter  Location: [facility] ICU    Patient: Debora Boo  MRN: [id number]  : 1940  Date of evaluation: 2023  ED Provider: Jayden Corley DO    [facility] [place] was checked out to me by Dr. Cristhian Hill. Please see his/her initial documentation for details of the patient's initial ED presentation, physical exam and completed studies. In brief, Debora Boo is a 79 y.o. female that presented to the emergency department abrupt onset of chest discomfort around 11:30 PM.  Reports she had been in her usual state of health prior to that. No recent fever or chills. States the pain radiates across her chest.  No pain into her back. Does feel little short of breath with it. No diaphoresis. Did develop vomiting after I assumed care. States she has a history of hypertension and complete heart block with pacemaker. Patient care is signed out to me pending final imaging results to evaluate for aortic pathology as patient became acutely hypotensive while in the ED. Remaining workup has been unremarkable with no clear etiology for patient's hypotension. Patient given IVF bolus without improvement and is currently on levophed. On exam, patient is awake, alert. Rhythm is paced on the monitor. No increased work of breathing. She has palpable pulses x4 extremities. No lower extremity tenderness, edema or asymmetry. EKG shows atrial sensed ventricular paced rhythm, stable from prior tracing. CT Aorta without acute aortic pathology. Patient does have small to moderate pericardial effusion. ECHO ordered per Dr. Es Velásquez prior discussion with cardiology. Patient does have consolidating infiltrate in bilateral lower lung fields. No leukocytosis or lactic acid elevation. Have covered with broad spectrum antibiotics.     I have reviewed and interpreted all of the currently available lab results and diagnostics from this visit:  Results for orders placed or Axis 26 degrees    Diagnosis       Atrial-sensed ventricular-paced rhythm  When compared with ECG of 06-NOV-2023 03:45,  Vent. rate has decreased BY  24 BPM  Confirmed by Mae Berry MD ([id number]) on 11/7/2023 7:32:55 AM     EKG 12 Lead   Result Value Ref Range    Ventricular Rate 74 BPM    Atrial Rate 74 BPM    P-R Interval 188 ms    QRS Duration 168 ms    Q-T Interval 438 ms    QTc Calculation (Bazett) 486 ms    P Axis 49 degrees    R Axis -57 degrees    T Axis 36 degrees    Diagnosis       Atrial-sensed ventricular-paced rhythm  Abnormal ECG  When compared with ECG of 06-NOV-2023 08:03,  Vent. rate has increased BY  11 BPM  Confirmed by Mae Berry MD ([id number]) on 11/7/2023 7:40:41 AM     EKG 12 Lead   Result Value Ref Range    Ventricular Rate 86 BPM    Atrial Rate 86 BPM    P-R Interval 176 ms    QRS Duration 154 ms    Q-T Interval 414 ms    QTc Calculation (Bazett) 495 ms    P Axis 101 degrees    R Axis -53 degrees    T Axis 42 degrees    Diagnosis       Atrial-sensed ventricular-paced rhythm  Abnormal ECG  When compared with ECG of 07-NOV-2023 02:51,  Vent.  rate has increased BY  12 BPM  Confirmed by Mae Berry MD ([id number]) on 11/8/2023 9:44:27 AM     Echo (TTE) limited (PRN contrast/bubble/strain/3D)   Result Value Ref Range    Body Surface Area 1.47 m2    Fractional Shortening 2D 26 28 - 44 %    LVIDd Index 2.12 cm/m2    LVIDs Index 1.58 cm/m2    LV RWT Ratio 0.71     LV Mass 2D 106.8 67 - 162 g    LV Mass 2D Index 73.2 43 - 95 g/m2    IVSd 1.2 (A) 0.6 - 0.9 cm    LVIDd 3.1 (A) 3.9 - 5.3 cm    LVIDs 2.3 cm    LVPWd 1.1 (A) 0.6 - 0.9 cm   Echo (TTE) limited (PRN contrast/bubble/strain/3D)   Result Value Ref Range    IVSd 1.2 (A) 0.6 - 0.9 cm    LVIDd 4.1 3.9 - 5.3 cm    LVIDs 2.6 cm    LVPWd 1.1 (A) 0.6 - 0.9 cm    Body Surface Area 1.47 m2    Fractional Shortening 2D 37 28 - 44 %    LVIDd Index 2.81 cm/m2    LVIDs Index 1.78 cm/m2    LV RWT Ratio 0.54     LV Mass 2D 161.4 67 -

## 2023-11-06 NOTE — ED NOTES
Pt voicing pain returning at this time. Voices how short acting IV pain medication is. Dr Yury Sanchez made aware and to put order in.       Michael Earl RN  11/06/23 [id number]

## 2023-11-07 ENCOUNTER — PROCEDURE VISIT (OUTPATIENT)
Dept: CARDIOLOGY CLINIC | Age: 83
End: 2023-11-07

## 2023-11-07 ENCOUNTER — APPOINTMENT (OUTPATIENT)
Dept: GENERAL RADIOLOGY | Age: 83
End: 2023-11-07
Payer: MEDICARE

## 2023-11-07 DIAGNOSIS — Z95.0 CARDIAC PACEMAKER IN SITU: Primary | ICD-10-CM

## 2023-11-07 DIAGNOSIS — I45.9 HEART BLOCK: ICD-10-CM

## 2023-11-07 LAB
ANION GAP SERPL CALCULATED.3IONS-SCNC: 10 MMOL/L (ref 4–16)
ANISOCYTOSIS: ABNORMAL
BACTERIA: NEGATIVE /HPF
BANDED NEUTROPHILS ABSOLUTE COUNT: 1.35 K/CU MM
BANDED NEUTROPHILS RELATIVE PERCENT: 9 % (ref 5–11)
BILIRUBIN URINE: NEGATIVE MG/DL
BLOOD, URINE: NEGATIVE
BUN SERPL-MCNC: 13 MG/DL (ref 6–23)
CALCIUM SERPL-MCNC: 7.6 MG/DL (ref 8.3–10.6)
CHLORIDE BLD-SCNC: 110 MMOL/L (ref 99–110)
CLARITY: CLEAR
CO2: 19 MMOL/L (ref 21–32)
COLOR: YELLOW
CREAT SERPL-MCNC: 0.7 MG/DL (ref 0.6–1.1)
CRP SERPL HS-MCNC: 163.7 MG/L
DIFFERENTIAL TYPE: ABNORMAL
EKG ATRIAL RATE: 63 BPM
EKG ATRIAL RATE: 63 BPM
EKG ATRIAL RATE: 74 BPM
EKG ATRIAL RATE: 87 BPM
EKG DIAGNOSIS: NORMAL
EKG P AXIS: -2 DEGREES
EKG P AXIS: 22 DEGREES
EKG P AXIS: 36 DEGREES
EKG P AXIS: 49 DEGREES
EKG P-R INTERVAL: 182 MS
EKG P-R INTERVAL: 188 MS
EKG P-R INTERVAL: 188 MS
EKG P-R INTERVAL: 194 MS
EKG Q-T INTERVAL: 406 MS
EKG Q-T INTERVAL: 438 MS
EKG Q-T INTERVAL: 458 MS
EKG Q-T INTERVAL: 472 MS
EKG QRS DURATION: 144 MS
EKG QRS DURATION: 152 MS
EKG QRS DURATION: 154 MS
EKG QRS DURATION: 168 MS
EKG QTC CALCULATION (BAZETT): 468 MS
EKG QTC CALCULATION (BAZETT): 483 MS
EKG QTC CALCULATION (BAZETT): 486 MS
EKG QTC CALCULATION (BAZETT): 488 MS
EKG R AXIS: -57 DEGREES
EKG R AXIS: -71 DEGREES
EKG R AXIS: -73 DEGREES
EKG R AXIS: -78 DEGREES
EKG T AXIS: 26 DEGREES
EKG T AXIS: 36 DEGREES
EKG T AXIS: 59 DEGREES
EKG T AXIS: 70 DEGREES
EKG VENTRICULAR RATE: 63 BPM
EKG VENTRICULAR RATE: 63 BPM
EKG VENTRICULAR RATE: 74 BPM
EKG VENTRICULAR RATE: 87 BPM
ERYTHROCYTE SEDIMENTATION RATE: 32 MM/HR (ref 0–30)
GFR SERPL CREATININE-BSD FRML MDRD: >60 ML/MIN/1.73M2
GLUCOSE SERPL-MCNC: 131 MG/DL (ref 70–99)
GLUCOSE, URINE: NEGATIVE MG/DL
HCT VFR BLD CALC: 30.1 % (ref 37–47)
HEMOGLOBIN: 9.4 GM/DL (ref 12.5–16)
KETONES, URINE: NEGATIVE MG/DL
L PNEUMO AG UR QL IA: NEGATIVE
LEUKOCYTE ESTERASE, URINE: NEGATIVE
LYMPHOCYTES ABSOLUTE: 1.5 K/CU MM
LYMPHOCYTES RELATIVE PERCENT: 10 % (ref 24–44)
MAGNESIUM: 1.7 MG/DL (ref 1.8–2.4)
MCH RBC QN AUTO: 31.3 PG (ref 27–31)
MCHC RBC AUTO-ENTMCNC: 31.2 % (ref 32–36)
MCV RBC AUTO: 100.3 FL (ref 78–100)
MONOCYTES ABSOLUTE: 2.1 K/CU MM
MONOCYTES RELATIVE PERCENT: 14 % (ref 0–4)
MUCUS: ABNORMAL HPF
NITRITE URINE, QUANTITATIVE: NEGATIVE
PDW BLD-RTO: 13.6 % (ref 11.7–14.9)
PH, URINE: 5 (ref 5–8)
PHOSPHORUS: 2.3 MG/DL (ref 2.5–4.9)
PLATELET # BLD: 266 K/CU MM (ref 140–440)
PMV BLD AUTO: 9.2 FL (ref 7.5–11.1)
POTASSIUM SERPL-SCNC: 4.1 MMOL/L (ref 3.5–5.1)
PROTEIN UA: ABNORMAL MG/DL
RBC # BLD: 3 M/CU MM (ref 4.2–5.4)
RBC # BLD: ABNORMAL 10*6/UL
RBC URINE: 4 /HPF (ref 0–6)
S PNEUM AG CSF QL: NORMAL
SEGMENTED NEUTROPHILS ABSOLUTE COUNT: 10 K/CU MM
SEGMENTED NEUTROPHILS RELATIVE PERCENT: 67 % (ref 36–66)
SODIUM BLD-SCNC: 139 MMOL/L (ref 135–145)
SPECIFIC GRAVITY UA: 1.02 (ref 1–1.03)
SQUAMOUS EPITHELIAL: 3 /HPF
TRICHOMONAS: ABNORMAL /HPF
UROBILINOGEN, URINE: 0.2 MG/DL (ref 0.2–1)
WBC # BLD: 15 K/CU MM (ref 4–10.5)
WBC UA: <1 /HPF (ref 0–5)

## 2023-11-07 PROCEDURE — 85652 RBC SED RATE AUTOMATED: CPT

## 2023-11-07 PROCEDURE — 6370000000 HC RX 637 (ALT 250 FOR IP): Performed by: STUDENT IN AN ORGANIZED HEALTH CARE EDUCATION/TRAINING PROGRAM

## 2023-11-07 PROCEDURE — 99232 SBSQ HOSP IP/OBS MODERATE 35: CPT | Performed by: INTERNAL MEDICINE

## 2023-11-07 PROCEDURE — 94761 N-INVAS EAR/PLS OXIMETRY MLT: CPT

## 2023-11-07 PROCEDURE — 93010 ELECTROCARDIOGRAM REPORT: CPT | Performed by: INTERNAL MEDICINE

## 2023-11-07 PROCEDURE — 80048 BASIC METABOLIC PNL TOTAL CA: CPT

## 2023-11-07 PROCEDURE — 85027 COMPLETE CBC AUTOMATED: CPT

## 2023-11-07 PROCEDURE — 84100 ASSAY OF PHOSPHORUS: CPT

## 2023-11-07 PROCEDURE — 2580000003 HC RX 258: Performed by: STUDENT IN AN ORGANIZED HEALTH CARE EDUCATION/TRAINING PROGRAM

## 2023-11-07 PROCEDURE — 82565 ASSAY OF CREATININE: CPT

## 2023-11-07 PROCEDURE — 94664 DEMO&/EVAL PT USE INHALER: CPT

## 2023-11-07 PROCEDURE — 94640 AIRWAY INHALATION TREATMENT: CPT

## 2023-11-07 PROCEDURE — 93005 ELECTROCARDIOGRAM TRACING: CPT | Performed by: STUDENT IN AN ORGANIZED HEALTH CARE EDUCATION/TRAINING PROGRAM

## 2023-11-07 PROCEDURE — 2000000000 HC ICU R&B

## 2023-11-07 PROCEDURE — 86140 C-REACTIVE PROTEIN: CPT

## 2023-11-07 PROCEDURE — 71045 X-RAY EXAM CHEST 1 VIEW: CPT

## 2023-11-07 PROCEDURE — 83735 ASSAY OF MAGNESIUM: CPT

## 2023-11-07 PROCEDURE — 2700000000 HC OXYGEN THERAPY PER DAY

## 2023-11-07 PROCEDURE — 6360000002 HC RX W HCPCS: Performed by: STUDENT IN AN ORGANIZED HEALTH CARE EDUCATION/TRAINING PROGRAM

## 2023-11-07 PROCEDURE — 2580000003 HC RX 258: Performed by: EMERGENCY MEDICINE

## 2023-11-07 PROCEDURE — 85007 BL SMEAR W/DIFF WBC COUNT: CPT

## 2023-11-07 RX ORDER — IPRATROPIUM BROMIDE AND ALBUTEROL SULFATE 2.5; .5 MG/3ML; MG/3ML
1 SOLUTION RESPIRATORY (INHALATION)
Status: COMPLETED | OUTPATIENT
Start: 2023-11-07 | End: 2023-11-08

## 2023-11-07 RX ADMIN — SODIUM CHLORIDE, PRESERVATIVE FREE 10 ML: 5 INJECTION INTRAVENOUS at 20:57

## 2023-11-07 RX ADMIN — MORPHINE SULFATE 2 MG: 2 INJECTION, SOLUTION INTRAMUSCULAR; INTRAVENOUS at 18:29

## 2023-11-07 RX ADMIN — SODIUM CHLORIDE: 9 INJECTION, SOLUTION INTRAVENOUS at 08:38

## 2023-11-07 RX ADMIN — ONDANSETRON 4 MG: 2 INJECTION INTRAMUSCULAR; INTRAVENOUS at 18:29

## 2023-11-07 RX ADMIN — PANTOPRAZOLE SODIUM 40 MG: 40 TABLET, DELAYED RELEASE ORAL at 08:42

## 2023-11-07 RX ADMIN — ONDANSETRON 4 MG: 2 INJECTION INTRAMUSCULAR; INTRAVENOUS at 09:16

## 2023-11-07 RX ADMIN — SODIUM CHLORIDE, PRESERVATIVE FREE 10 ML: 5 INJECTION INTRAVENOUS at 08:42

## 2023-11-07 RX ADMIN — KETOROLAC TROMETHAMINE 15 MG: 30 INJECTION, SOLUTION INTRAMUSCULAR; INTRAVENOUS at 08:42

## 2023-11-07 RX ADMIN — ONDANSETRON 4 MG: 2 INJECTION INTRAMUSCULAR; INTRAVENOUS at 04:41

## 2023-11-07 RX ADMIN — MORPHINE SULFATE 2 MG: 2 INJECTION, SOLUTION INTRAMUSCULAR; INTRAVENOUS at 21:47

## 2023-11-07 RX ADMIN — KETOROLAC TROMETHAMINE 15 MG: 30 INJECTION, SOLUTION INTRAMUSCULAR; INTRAVENOUS at 01:29

## 2023-11-07 RX ADMIN — ENOXAPARIN SODIUM 30 MG: 100 INJECTION SUBCUTANEOUS at 08:42

## 2023-11-07 RX ADMIN — CEFTRIAXONE 1000 MG: 1 INJECTION, POWDER, FOR SOLUTION INTRAMUSCULAR; INTRAVENOUS at 20:55

## 2023-11-07 RX ADMIN — SODIUM CHLORIDE, PRESERVATIVE FREE 10 ML: 5 INJECTION INTRAVENOUS at 14:40

## 2023-11-07 RX ADMIN — IPRATROPIUM BROMIDE AND ALBUTEROL SULFATE 1 DOSE: 2.5; .5 SOLUTION RESPIRATORY (INHALATION) at 16:00

## 2023-11-07 RX ADMIN — MORPHINE SULFATE 2 MG: 2 INJECTION, SOLUTION INTRAMUSCULAR; INTRAVENOUS at 04:41

## 2023-11-07 RX ADMIN — COLCHICINE 0.6 MG: 0.6 TABLET ORAL at 08:41

## 2023-11-07 RX ADMIN — KETOROLAC TROMETHAMINE 15 MG: 30 INJECTION, SOLUTION INTRAMUSCULAR; INTRAVENOUS at 14:40

## 2023-11-07 RX ADMIN — KETOROLAC TROMETHAMINE 15 MG: 30 INJECTION, SOLUTION INTRAMUSCULAR; INTRAVENOUS at 20:56

## 2023-11-07 RX ADMIN — IPRATROPIUM BROMIDE AND ALBUTEROL SULFATE 1 DOSE: 2.5; .5 SOLUTION RESPIRATORY (INHALATION) at 23:05

## 2023-11-07 ASSESSMENT — PAIN SCALES - GENERAL
PAINLEVEL_OUTOF10: 2
PAINLEVEL_OUTOF10: 0
PAINLEVEL_OUTOF10: 8
PAINLEVEL_OUTOF10: 2
PAINLEVEL_OUTOF10: 8
PAINLEVEL_OUTOF10: 7
PAINLEVEL_OUTOF10: 2
PAINLEVEL_OUTOF10: 2
PAINLEVEL_OUTOF10: 4
PAINLEVEL_OUTOF10: 0
PAINLEVEL_OUTOF10: 5
PAINLEVEL_OUTOF10: 3
PAINLEVEL_OUTOF10: 4
PAINLEVEL_OUTOF10: 7

## 2023-11-07 ASSESSMENT — PAIN DESCRIPTION - LOCATION
LOCATION: CHEST

## 2023-11-07 ASSESSMENT — PAIN - FUNCTIONAL ASSESSMENT
PAIN_FUNCTIONAL_ASSESSMENT: PREVENTS OR INTERFERES SOME ACTIVE ACTIVITIES AND ADLS

## 2023-11-07 ASSESSMENT — PAIN DESCRIPTION - DESCRIPTORS
DESCRIPTORS: SORE;PRESSURE
DESCRIPTORS: PRESSURE
DESCRIPTORS: PRESSURE;SORE
DESCRIPTORS: PRESSURE;SORE

## 2023-11-07 ASSESSMENT — PAIN DESCRIPTION - DIRECTION: RADIATING_TOWARDS: LEFT LOWER ABDOMEN

## 2023-11-07 ASSESSMENT — PAIN DESCRIPTION - PAIN TYPE: TYPE: ACUTE PAIN

## 2023-11-07 ASSESSMENT — PAIN DESCRIPTION - ORIENTATION
ORIENTATION: MID;INNER
ORIENTATION: MID
ORIENTATION: MID
ORIENTATION: LEFT
ORIENTATION: MID

## 2023-11-07 ASSESSMENT — PAIN DESCRIPTION - FREQUENCY: FREQUENCY: INTERMITTENT

## 2023-11-07 ASSESSMENT — PAIN DESCRIPTION - ONSET: ONSET: ON-GOING

## 2023-11-07 NOTE — CONSULTS
V2.0  Internal Medicine Consult      Name:  Jose Kurtz /Age/Sex: 1940  (79 y.o. female)   MRN & CSN:  [id number] & [id number] Encounter Date/Time: 2023 12:16 PM EST   Location:  -A PCP: Giulia Hathaway MD       Hospital Day: 2      History of Present Illness:     Chief Complaint: chest pain    Jose Kurtz is a 79 y.o. female with PMH of HTN, complete heart block s/p PPM, CAD who presents with chest pain started 11pm the night before admission. Described as burning across cehst. Took Maalox and Pepto-Bismol no help. Denies SOB, or cough. Pt had CTA Chest ruled out dissection or PE but showed pericardial effusion. Received few fentanyl doses became hypotensive not improved despite IVF and albumin afterward started on pressors. Pt was transferred to ICU. Patient seen and examined at bedside. States her chest pain has almost resolved. Denies SOB. No lightheadedness or dizziness. Assessment and Plan:   Chest pain. Could be 2/2 pericarditis; maybe pain 2/2 PNA. ECHO confirmed moderate pericardial effusion. No sign of cardiac tamponade. ECG no diffuse ST elevations. Started on NSAID and colchicine with improvement in symptoms.   - CRP, sed rate ordered  - continue colchicine, Toradol  - cardiology consulted; appreciate recs  - treated for PNA as below     Acute hypoxic respiratory failure 2/2 suspected community acquired pneumonia. No symptoms aside from chest pain. CT Chest showed bilateral consolidating infiltrates. No sign of sepsis but came w WBC 11.3 today 15.0. Resp viral panel negative. - on azithro, rocephin; ICU managing  - resp cx, strep/legionella ordered pending    Hypotension. Received IVF and albumin requiring pressors. No sign of sepsis except leukocytosis. No sign of cardiac tamponade per ECHO. Could be 2/2 fentanyl doses. Pt was on levophed until this morning.   - management as above  - cardiology following    Complete heart block.  S/p narrowing, foraminal stenosis and nerve effacement/compression also noted visualized lower cervical spine. Comment: Findings of pericardial effusion and bilateral pulmonary consolidating infiltrates discussed with ER attending Dr. Taqueria Beth at [phone] hours on 11/06/2023     XR CHEST PORTABLE    Result Date: 11/6/2023  EXAMINATION: ONE XRAY VIEW OF THE CHEST 11/6/2023 4:08 am COMPARISON: 05/09/2018 HISTORY: ORDERING SYSTEM PROVIDED HISTORY: chest pain TECHNOLOGIST PROVIDED HISTORY: Reason for exam:->chest pain Reason for Exam: chest pain FINDINGS: Implanted cardiac device noted. Aortic atherosclerosis. Cardiac and mediastinal silhouettes appear similar. No acute pulmonary process. No acute osseous abnormality is identified. Stable appearing chest without acute cardiopulmonary process. 1. Aortic atherosclerosis.        Personally reviewed Lab Studies, Imaging, and discussed case with pt    Electronically signed by Jennifer Stuart MD on 11/7/2023 at 12:31 PM

## 2023-11-07 NOTE — PROGRESS NOTES
V2.0  Critical Care Progress Note  Name:  Maya Ansari /Age/Sex: 1940  (79 y.o. female)   MRN & CSN:  [id number] & [id number] Encounter Date/Time: 2023 6:28 PM EST   Location:  -A PCP: Alysa Balderrama MD       Hospital Day: 2    Assessment and Plan:   Maya Ansari is a 79 y.o. female with a pmh of PPM who presents with chest pain    Hospital Problems             Last Modified POA    * (Principal) Hypotension 2023 Yes    Unstable angina pectoris ([address]) 2023 Yes        Chest pain  Shock  Concern for PNA  Pericardial effusion  Concern for pericariditis  Hx of PPM      Neuro - pain control with NSAIDS. CV -pressors have been weaned off. Normotensive at home per records. Troponin levels have been negative. Chest pain - neg trops x2. Echocardiogram from 2023 reviewed. Noted to have circumferential pericardial effusion. Cardiology following, appreciate recommendations. CTA chest neg for dissection    Resp - on 2L NC. Started on CAP coverage, continue. Urine antigens negative, blood cultures NGTD, sputum culture pending. GI - diet as tolerated. GI ppx while on nsaids    Gu -no active issues. Normal renal function. Monitor UOP and lytes    ID - concern for PNA. Infiltrates on CT. afebrile with mild leukocytosis. Started on CAP coverage, continue. Heme -slight dip in hemoglobin, 9.4 today. No signs of bleeding. DVT prophylaxis: lovenox    Disposition:   Current Living situation: home  Expected Disposition: tbd  Estimated D/C: tbd    Diet ADULT DIET; Regular   DVT Prophylaxis [x] Lovenox, []  Heparin, [] SCDs, [] Ambulation,  [] Eliquis, [] Xarelto, [] Coumadin   Code Status Full Code   Surrogate Decision Maker/ POA Siblings           Subjective:   Patient seen and examined. Labs and vitals reviewed. Patient reports she is feeling better. She did report an episode of bandlike chest pain earlier this morning. This is now resolved.   She

## 2023-11-08 LAB
ANION GAP SERPL CALCULATED.3IONS-SCNC: 10 MMOL/L (ref 4–16)
BASOPHILS ABSOLUTE: 0 K/CU MM
BASOPHILS RELATIVE PERCENT: 0.2 % (ref 0–1)
BUN SERPL-MCNC: 8 MG/DL (ref 6–23)
CALCIUM SERPL-MCNC: 7 MG/DL (ref 8.3–10.6)
CHLORIDE BLD-SCNC: 108 MMOL/L (ref 99–110)
CO2: 19 MMOL/L (ref 21–32)
CREAT SERPL-MCNC: 0.6 MG/DL (ref 0.6–1.1)
CULTURE: NORMAL
DIFFERENTIAL TYPE: ABNORMAL
EKG ATRIAL RATE: 86 BPM
EKG DIAGNOSIS: NORMAL
EKG P AXIS: 101 DEGREES
EKG P-R INTERVAL: 176 MS
EKG Q-T INTERVAL: 414 MS
EKG QRS DURATION: 154 MS
EKG QTC CALCULATION (BAZETT): 495 MS
EKG R AXIS: -53 DEGREES
EKG T AXIS: 42 DEGREES
EKG VENTRICULAR RATE: 86 BPM
EOSINOPHILS ABSOLUTE: 0.1 K/CU MM
EOSINOPHILS RELATIVE PERCENT: 0.5 % (ref 0–3)
GFR SERPL CREATININE-BSD FRML MDRD: >60 ML/MIN/1.73M2
GLUCOSE SERPL-MCNC: 91 MG/DL (ref 70–99)
HCT VFR BLD CALC: 27.9 % (ref 37–47)
HEMOGLOBIN: 8.6 GM/DL (ref 12.5–16)
IMMATURE NEUTROPHIL %: 0.3 % (ref 0–0.43)
LYMPHOCYTES ABSOLUTE: 1.6 K/CU MM
LYMPHOCYTES RELATIVE PERCENT: 16.1 % (ref 24–44)
Lab: NORMAL
MAGNESIUM: 1.8 MG/DL (ref 1.8–2.4)
MCH RBC QN AUTO: 31.6 PG (ref 27–31)
MCHC RBC AUTO-ENTMCNC: 30.8 % (ref 32–36)
MCV RBC AUTO: 102.6 FL (ref 78–100)
MONOCYTES ABSOLUTE: 1.1 K/CU MM
MONOCYTES RELATIVE PERCENT: 10.7 % (ref 0–4)
NUCLEATED RBC %: 0 %
PDW BLD-RTO: 13.8 % (ref 11.7–14.9)
PHOSPHORUS: 1.8 MG/DL (ref 2.5–4.9)
PLATELET # BLD: 199 K/CU MM (ref 140–440)
PMV BLD AUTO: 9.3 FL (ref 7.5–11.1)
POTASSIUM SERPL-SCNC: 3.9 MMOL/L (ref 3.5–5.1)
RBC # BLD: 2.72 M/CU MM (ref 4.2–5.4)
SEGMENTED NEUTROPHILS ABSOLUTE COUNT: 7.3 K/CU MM
SEGMENTED NEUTROPHILS RELATIVE PERCENT: 72.2 % (ref 36–66)
SODIUM BLD-SCNC: 137 MMOL/L (ref 135–145)
SPECIMEN: NORMAL
TOTAL IMMATURE NEUTOROPHIL: 0.03 K/CU MM
TOTAL NUCLEATED RBC: 0 K/CU MM
WBC # BLD: 10.1 K/CU MM (ref 4–10.5)

## 2023-11-08 PROCEDURE — 6370000000 HC RX 637 (ALT 250 FOR IP): Performed by: STUDENT IN AN ORGANIZED HEALTH CARE EDUCATION/TRAINING PROGRAM

## 2023-11-08 PROCEDURE — 2000000000 HC ICU R&B

## 2023-11-08 PROCEDURE — 2700000000 HC OXYGEN THERAPY PER DAY

## 2023-11-08 PROCEDURE — 6370000000 HC RX 637 (ALT 250 FOR IP): Performed by: NURSE PRACTITIONER

## 2023-11-08 PROCEDURE — 93010 ELECTROCARDIOGRAM REPORT: CPT | Performed by: INTERNAL MEDICINE

## 2023-11-08 PROCEDURE — 76937 US GUIDE VASCULAR ACCESS: CPT

## 2023-11-08 PROCEDURE — 2060000000 HC ICU INTERMEDIATE R&B

## 2023-11-08 PROCEDURE — 2500000003 HC RX 250 WO HCPCS: Performed by: NURSE PRACTITIONER

## 2023-11-08 PROCEDURE — 36410 VNPNXR 3YR/> PHY/QHP DX/THER: CPT

## 2023-11-08 PROCEDURE — 80048 BASIC METABOLIC PNL TOTAL CA: CPT

## 2023-11-08 PROCEDURE — 84100 ASSAY OF PHOSPHORUS: CPT

## 2023-11-08 PROCEDURE — 2580000003 HC RX 258: Performed by: NURSE PRACTITIONER

## 2023-11-08 PROCEDURE — 6370000000 HC RX 637 (ALT 250 FOR IP)

## 2023-11-08 PROCEDURE — 85025 COMPLETE CBC W/AUTO DIFF WBC: CPT

## 2023-11-08 PROCEDURE — 2580000003 HC RX 258: Performed by: STUDENT IN AN ORGANIZED HEALTH CARE EDUCATION/TRAINING PROGRAM

## 2023-11-08 PROCEDURE — 82565 ASSAY OF CREATININE: CPT

## 2023-11-08 PROCEDURE — 94640 AIRWAY INHALATION TREATMENT: CPT

## 2023-11-08 PROCEDURE — 6360000002 HC RX W HCPCS: Performed by: STUDENT IN AN ORGANIZED HEALTH CARE EDUCATION/TRAINING PROGRAM

## 2023-11-08 PROCEDURE — 99232 SBSQ HOSP IP/OBS MODERATE 35: CPT | Performed by: INTERNAL MEDICINE

## 2023-11-08 PROCEDURE — 02HV33Z INSERTION OF INFUSION DEVICE INTO SUPERIOR VENA CAVA, PERCUTANEOUS APPROACH: ICD-10-PCS | Performed by: STUDENT IN AN ORGANIZED HEALTH CARE EDUCATION/TRAINING PROGRAM

## 2023-11-08 PROCEDURE — 83735 ASSAY OF MAGNESIUM: CPT

## 2023-11-08 PROCEDURE — 94761 N-INVAS EAR/PLS OXIMETRY MLT: CPT

## 2023-11-08 RX ORDER — DICYCLOMINE HCL 20 MG
10 TABLET ORAL 3 TIMES DAILY PRN
Status: DISCONTINUED | OUTPATIENT
Start: 2023-11-08 | End: 2023-11-10 | Stop reason: HOSPADM

## 2023-11-08 RX ORDER — HYDROXYZINE HYDROCHLORIDE 10 MG/1
10 TABLET, FILM COATED ORAL 2 TIMES DAILY PRN
Status: DISCONTINUED | OUTPATIENT
Start: 2023-11-08 | End: 2023-11-08

## 2023-11-08 RX ORDER — CEFDINIR 300 MG/1
300 CAPSULE ORAL EVERY 12 HOURS SCHEDULED
Status: DISCONTINUED | OUTPATIENT
Start: 2023-11-08 | End: 2023-11-10 | Stop reason: HOSPADM

## 2023-11-08 RX ORDER — ALPRAZOLAM 0.25 MG/1
0.25 TABLET ORAL 2 TIMES DAILY PRN
Status: DISCONTINUED | OUTPATIENT
Start: 2023-11-08 | End: 2023-11-10 | Stop reason: HOSPADM

## 2023-11-08 RX ADMIN — HYDROXYZINE HYDROCHLORIDE 10 MG: 10 TABLET, FILM COATED ORAL at 01:02

## 2023-11-08 RX ADMIN — CEFDINIR 300 MG: 300 CAPSULE ORAL at 10:21

## 2023-11-08 RX ADMIN — SODIUM CHLORIDE, PRESERVATIVE FREE 10 ML: 5 INJECTION INTRAVENOUS at 09:16

## 2023-11-08 RX ADMIN — SODIUM CHLORIDE, PRESERVATIVE FREE 10 ML: 5 INJECTION INTRAVENOUS at 21:16

## 2023-11-08 RX ADMIN — ALPRAZOLAM 0.25 MG: 0.25 TABLET ORAL at 21:16

## 2023-11-08 RX ADMIN — KETOROLAC TROMETHAMINE 15 MG: 30 INJECTION, SOLUTION INTRAMUSCULAR; INTRAVENOUS at 00:42

## 2023-11-08 RX ADMIN — SODIUM PHOSPHATE, MONOBASIC, MONOHYDRATE AND SODIUM PHOSPHATE, DIBASIC, ANHYDROUS 15 MMOL: 142; 276 INJECTION, SOLUTION INTRAVENOUS at 12:24

## 2023-11-08 RX ADMIN — KETOROLAC TROMETHAMINE 15 MG: 30 INJECTION, SOLUTION INTRAMUSCULAR; INTRAVENOUS at 09:13

## 2023-11-08 RX ADMIN — ENOXAPARIN SODIUM 30 MG: 100 INJECTION SUBCUTANEOUS at 09:15

## 2023-11-08 RX ADMIN — KETOROLAC TROMETHAMINE 15 MG: 30 INJECTION, SOLUTION INTRAMUSCULAR; INTRAVENOUS at 18:11

## 2023-11-08 RX ADMIN — COLCHICINE 0.6 MG: 0.6 TABLET ORAL at 09:13

## 2023-11-08 RX ADMIN — PANTOPRAZOLE SODIUM 40 MG: 40 TABLET, DELAYED RELEASE ORAL at 09:13

## 2023-11-08 RX ADMIN — CEFDINIR 300 MG: 300 CAPSULE ORAL at 21:16

## 2023-11-08 RX ADMIN — DICYCLOMINE HYDROCHLORIDE 10 MG: 20 TABLET ORAL at 00:40

## 2023-11-08 RX ADMIN — IPRATROPIUM BROMIDE AND ALBUTEROL SULFATE 1 DOSE: 2.5; .5 SOLUTION RESPIRATORY (INHALATION) at 07:41

## 2023-11-08 RX ADMIN — IPRATROPIUM BROMIDE AND ALBUTEROL SULFATE 1 DOSE: 2.5; .5 SOLUTION RESPIRATORY (INHALATION) at 11:48

## 2023-11-08 ASSESSMENT — PAIN DESCRIPTION - DESCRIPTORS: DESCRIPTORS: DISCOMFORT;PRESSURE

## 2023-11-08 ASSESSMENT — PAIN SCALES - GENERAL
PAINLEVEL_OUTOF10: 3
PAINLEVEL_OUTOF10: 5
PAINLEVEL_OUTOF10: 4

## 2023-11-08 ASSESSMENT — PAIN DESCRIPTION - LOCATION: LOCATION: CHEST

## 2023-11-08 NOTE — PROGRESS NOTES
11/08/23 1456   Encounter Summary   Encounter Overview/Reason  Initial Encounter   Service Provided For: Patient   Referral/Consult From: [address] Children;Family members   Last Encounter  11/08/23   Complexity of Encounter Low   Begin Time 1450   Crisis   Type Emotional distress   Spiritual/Emotional needs   Type Spiritual Support   Grief, Loss, and Adjustments   Type Adjustment to illness   Assessment/Intervention/Outcome   Assessment Coping;Powerlessness   Intervention Prayer (assurance of)/[place];Sustaining Presence/Ministry of presence;Empowerment   Outcome Coping   Plan and Referrals   Plan/Referrals Continue Support (comment)  (as needed)

## 2023-11-08 NOTE — PROGRESS NOTES
V2.0  [facility]      Name:  Darlyn Day /Age/Sex: 1940  (79 y.o. female)   MRN & CSN:  [id number] & [id number] Encounter Date/Time: 2023 8:57 AM EST    Location:  -A PCP: Theo Fam MD       Hospital Day: 3      Subjective:     Chief Complaint:  Chest Pain (4 hours/)     Patient seen and examined at bedside. States she feels her SOB is worse, has chest pain but much improved. Denies cough. Pt states she feels she is getting weaker and more SOB than when she came in. No n/v/d. Assessment and Plan:   Chest pain. Likely 2/2 pericarditis; maybe pain 2/2 PNA. ECHO confirmed moderate pericardial effusion. No sign of cardiac tamponade. ECG no diffuse ST elevations. CRP, sed rate elevated  Started on NSAID and colchicine with improvement in symptoms. - continue colchicine, Toradol  - cardiology consulted; appreciate recs  - treated for PNA as below      Acute hypoxic respiratory failure 2/2 suspected community acquired pneumonia. No symptoms aside from chest pain. CT Chest showed bilateral consolidating infiltrates. No sign of sepsis but came w WBC 11.3 ->15.0 -> 10.1. Resp viral panel negative. Strep, legionella negative. Azithromycin d/sherri  - on rocephin; ICU managing     Hypotension - resolved. Received IVF and albumin requiring pressors. No sign of sepsis except leukocytosis. No sign of cardiac tamponade per ECHO. Could be 2/2 fentanyl doses. Pt was on levophed now off.   - management as above  - cardiology following     Macrocytic anemia. Hgb has been trending down today 8.6 from 12.5 on admission.   - recommend anemia workup    Complete heart block. S/p PPM.   Interrogated. Cards following      Personally reviewed Lab Studies and Imaging     Drugs that require monitoring for toxicity include lovenox and the method of monitoring was cbc      Diet ADULT DIET;  Regular   DVT Prophylaxis [x] Lovenox, []  Heparin, [] SCDs, [] Ambulation,  [] QRS Duration 154 ms    Q-T Interval 414 ms    QTc Calculation (Bazett) 495 ms    P Axis 101 degrees    R Axis -53 degrees    T Axis 42 degrees    Diagnosis       Atrial-sensed ventricular-paced rhythm  Abnormal ECG  When compared with ECG of 07-NOV-2023 02:51,  Vent.  rate has increased BY  12 BPM     C-Reactive Protein    Collection Time: 11/07/23  6:44 PM   Result Value Ref Range    CRP High Sensitivity 163.7 (H) <5.0 mg/L   Sedimentation Rate    Collection Time: 11/07/23  6:44 PM   Result Value Ref Range    Sed Rate 32 (H) 0 - 30 MM/HR   CBC with Auto Differential    Collection Time: 11/08/23  6:06 AM   Result Value Ref Range    WBC 10.1 4.0 - 10.5 K/CU MM    RBC 2.72 (L) 4.2 - 5.4 M/CU MM    Hemoglobin 8.6 (L) 12.5 - 16.0 GM/DL    Hematocrit 27.9 (L) 37 - 47 %    .6 (H) 78 - 100 FL    MCH 31.6 (H) 27 - 31 PG    MCHC 30.8 (L) 32.0 - 36.0 %    RDW 13.8 11.7 - 14.9 %    Platelets 765 853 - 691 K/CU MM    MPV 9.3 7.5 - 11.1 FL    Differential Type AUTOMATED DIFFERENTIAL     Segs Relative 72.2 (H) 36 - 66 %    Lymphocytes % 16.1 (L) 24 - 44 %    Monocytes % 10.7 (H) 0 - 4 %    Eosinophils % 0.5 0 - 3 %    Basophils % 0.2 0 - 1 %    Segs Absolute 7.3 K/CU MM    Lymphocytes Absolute 1.6 K/CU MM    Monocytes Absolute 1.1 K/CU MM    Eosinophils Absolute 0.1 K/CU MM    Basophils Absolute 0.0 K/CU MM    Nucleated RBC % 0.0 %    Total Nucleated RBC 0.0 K/CU MM    Total Immature Neutrophil 0.03 K/CU MM    Immature Neutrophil % 0.3 0 - 0.43 %   Critical Care Panel    Collection Time: 11/08/23  6:06 AM   Result Value Ref Range    Sodium 137 135 - 145 MMOL/L    Potassium 3.9 3.5 - 5.1 MMOL/L    Chloride 108 99 - 110 mMol/L    CO2 19 (L) 21 - 32 MMOL/L    Anion Gap 10 4 - 16    Glucose 91 70 - 99 MG/DL    BUN 8 6 - 23 MG/DL    Creatinine 0.6 0.6 - 1.1 MG/DL    Est, Glom Filt Rate >60 >60 mL/min/1.73m2    Calcium 7.0 (L) 8.3 - 10.6 MG/DL    Phosphorus 1.8 (L) 2.5 - 4.9 MG/DL    Magnesium 1.8 1.8 - 2.4 mg/dl

## 2023-11-08 NOTE — CARE COORDINATION
Discussed in IDR. May benefit from PT/OT eval   as patient claims increased weakness. Encouraged up in chair and OOB as much as possible. Alistair Lackey RN     1200 PS message sent to Dr Xu Irvin for PT/OT orders. Alistair Lackey RN     [address]. Patient in beds asleep. No family present.  Alistair Lackey RN

## 2023-11-08 NOTE — PROGRESS NOTES
V2.0  Critical Care Progress Note  Name:  Giselle Calvo /Age/Sex: 1940  (79 y.o. female)   MRN & CSN:  [id number] & [id number] Encounter Date/Time: 2023 6:28 PM EST   Location:  -A PCP: Chris Cornejo MD       Hospital Day: 3    Assessment and Plan:   Giselle Calvo is a 79 y.o. female with a pmh of PPM who presents with chest pain    Hospital Problems             Last Modified POA    * (Principal) Hypotension 2023 Yes    Unstable angina pectoris ([address]) 2023 Yes      Chest pain  Shock, resolved  Concern for PNA  Pericardial effusion  Concern for pericariditis  Hx of PPM      Neuro - pain control with NSAIDS. Patient does appear to have a little bit of anxiety. Review of records notes a prescription for Ativan back in . Plan to start as needed low-dose Xanax to aid with anxiety. She does endorse that she feels anxious. CV -Normotensive at home per records. Troponin levels have been negative. Chest pain - neg trops x2. Echocardiogram from 2023 reviewed. Noted to have circumferential pericardial effusion. Cardiology following, appreciate recommendations. CTA chest neg for dissection. Resp - on 2L NC, continue. Wean as able to keep sats greater 92%. Started on CAP coverage. Addition to Omnicef PO, continue. Urine antigens negative, blood cultures NGTD, sputum culture pending. GI - Diet as tolerated. GI ppx while on nsaids    Gu -no active issues. Normal renal function. Monitor UOP and lytes    ID - concern for PNA. Infiltrates on CT. continue antibiotics as above. Heme -hemoglobin stable, 8.6. No signs of bleeding. DVT prophylaxis: lovenox    Patient able for transfer to the ICU today. Orders placed for stepdown unit. Hospital medicine physician made attending, he was notified. Critical care team was notified this time. Please call with any questions.     Disposition:   Current Living situation: home  Expected

## 2023-11-09 ENCOUNTER — APPOINTMENT (OUTPATIENT)
Dept: GENERAL RADIOLOGY | Age: 83
End: 2023-11-09
Payer: MEDICARE

## 2023-11-09 LAB
ANION GAP SERPL CALCULATED.3IONS-SCNC: 10 MMOL/L (ref 4–16)
BASOPHILS ABSOLUTE: 0 K/CU MM
BASOPHILS RELATIVE PERCENT: 0.3 % (ref 0–1)
BUN SERPL-MCNC: 5 MG/DL (ref 6–23)
CALCIUM SERPL-MCNC: 7.5 MG/DL (ref 8.3–10.6)
CHLORIDE BLD-SCNC: 106 MMOL/L (ref 99–110)
CO2: 22 MMOL/L (ref 21–32)
CREAT SERPL-MCNC: 0.5 MG/DL (ref 0.6–1.1)
DIFFERENTIAL TYPE: ABNORMAL
EOSINOPHILS ABSOLUTE: 0.2 K/CU MM
EOSINOPHILS RELATIVE PERCENT: 3 % (ref 0–3)
GFR SERPL CREATININE-BSD FRML MDRD: >60 ML/MIN/1.73M2
GLUCOSE SERPL-MCNC: 89 MG/DL (ref 70–99)
HCT VFR BLD CALC: 26 % (ref 37–47)
HEMOGLOBIN: 8.4 GM/DL (ref 12.5–16)
IMMATURE NEUTROPHIL %: 0.3 % (ref 0–0.43)
LYMPHOCYTES ABSOLUTE: 1.2 K/CU MM
LYMPHOCYTES RELATIVE PERCENT: 16.1 % (ref 24–44)
MAGNESIUM: 1.9 MG/DL (ref 1.8–2.4)
MCH RBC QN AUTO: 31.6 PG (ref 27–31)
MCHC RBC AUTO-ENTMCNC: 32.3 % (ref 32–36)
MCV RBC AUTO: 97.7 FL (ref 78–100)
MONOCYTES ABSOLUTE: 0.7 K/CU MM
MONOCYTES RELATIVE PERCENT: 9.5 % (ref 0–4)
NUCLEATED RBC %: 0 %
PDW BLD-RTO: 13.4 % (ref 11.7–14.9)
PHOSPHORUS: 1.3 MG/DL (ref 2.5–4.9)
PLATELET # BLD: 243 K/CU MM (ref 140–440)
PMV BLD AUTO: 9.3 FL (ref 7.5–11.1)
POTASSIUM SERPL-SCNC: 3.3 MMOL/L (ref 3.5–5.1)
PRO-BNP: 4935 PG/ML
RBC # BLD: 2.66 M/CU MM (ref 4.2–5.4)
SEGMENTED NEUTROPHILS ABSOLUTE COUNT: 5.2 K/CU MM
SEGMENTED NEUTROPHILS RELATIVE PERCENT: 70.8 % (ref 36–66)
SODIUM BLD-SCNC: 138 MMOL/L (ref 135–145)
TOTAL IMMATURE NEUTOROPHIL: 0.02 K/CU MM
TOTAL NUCLEATED RBC: 0 K/CU MM
WBC # BLD: 7.4 K/CU MM (ref 4–10.5)

## 2023-11-09 PROCEDURE — 94664 DEMO&/EVAL PT USE INHALER: CPT

## 2023-11-09 PROCEDURE — 6370000000 HC RX 637 (ALT 250 FOR IP): Performed by: STUDENT IN AN ORGANIZED HEALTH CARE EDUCATION/TRAINING PROGRAM

## 2023-11-09 PROCEDURE — 6370000000 HC RX 637 (ALT 250 FOR IP): Performed by: INTERNAL MEDICINE

## 2023-11-09 PROCEDURE — 99233 SBSQ HOSP IP/OBS HIGH 50: CPT | Performed by: INTERNAL MEDICINE

## 2023-11-09 PROCEDURE — 6360000002 HC RX W HCPCS: Performed by: STUDENT IN AN ORGANIZED HEALTH CARE EDUCATION/TRAINING PROGRAM

## 2023-11-09 PROCEDURE — 94150 VITAL CAPACITY TEST: CPT

## 2023-11-09 PROCEDURE — 6360000002 HC RX W HCPCS: Performed by: INTERNAL MEDICINE

## 2023-11-09 PROCEDURE — 97530 THERAPEUTIC ACTIVITIES: CPT

## 2023-11-09 PROCEDURE — 97162 PT EVAL MOD COMPLEX 30 MIN: CPT

## 2023-11-09 PROCEDURE — 71045 X-RAY EXAM CHEST 1 VIEW: CPT

## 2023-11-09 PROCEDURE — 94640 AIRWAY INHALATION TREATMENT: CPT

## 2023-11-09 PROCEDURE — 80048 BASIC METABOLIC PNL TOTAL CA: CPT

## 2023-11-09 PROCEDURE — 97166 OT EVAL MOD COMPLEX 45 MIN: CPT

## 2023-11-09 PROCEDURE — 2580000003 HC RX 258: Performed by: STUDENT IN AN ORGANIZED HEALTH CARE EDUCATION/TRAINING PROGRAM

## 2023-11-09 PROCEDURE — 84100 ASSAY OF PHOSPHORUS: CPT

## 2023-11-09 PROCEDURE — 97116 GAIT TRAINING THERAPY: CPT

## 2023-11-09 PROCEDURE — 83880 ASSAY OF NATRIURETIC PEPTIDE: CPT

## 2023-11-09 PROCEDURE — 6360000002 HC RX W HCPCS: Performed by: EMERGENCY MEDICINE

## 2023-11-09 PROCEDURE — 83735 ASSAY OF MAGNESIUM: CPT

## 2023-11-09 PROCEDURE — 85025 COMPLETE CBC W/AUTO DIFF WBC: CPT

## 2023-11-09 PROCEDURE — 94761 N-INVAS EAR/PLS OXIMETRY MLT: CPT

## 2023-11-09 PROCEDURE — 2060000000 HC ICU INTERMEDIATE R&B

## 2023-11-09 RX ORDER — IPRATROPIUM BROMIDE AND ALBUTEROL SULFATE 2.5; .5 MG/3ML; MG/3ML
1 SOLUTION RESPIRATORY (INHALATION) EVERY 4 HOURS PRN
Status: DISCONTINUED | OUTPATIENT
Start: 2023-11-09 | End: 2023-11-10 | Stop reason: HOSPADM

## 2023-11-09 RX ORDER — FUROSEMIDE 10 MG/ML
20 INJECTION INTRAMUSCULAR; INTRAVENOUS ONCE
Status: COMPLETED | OUTPATIENT
Start: 2023-11-09 | End: 2023-11-09

## 2023-11-09 RX ORDER — POTASSIUM CHLORIDE 20 MEQ/1
40 TABLET, EXTENDED RELEASE ORAL ONCE
Status: COMPLETED | OUTPATIENT
Start: 2023-11-09 | End: 2023-11-09

## 2023-11-09 RX ORDER — IPRATROPIUM BROMIDE AND ALBUTEROL SULFATE 2.5; .5 MG/3ML; MG/3ML
1 SOLUTION RESPIRATORY (INHALATION)
Status: DISCONTINUED | OUTPATIENT
Start: 2023-11-09 | End: 2023-11-09

## 2023-11-09 RX ORDER — IBUPROFEN 400 MG/1
600 TABLET ORAL
Status: DISCONTINUED | OUTPATIENT
Start: 2023-11-09 | End: 2023-11-10 | Stop reason: HOSPADM

## 2023-11-09 RX ADMIN — IPRATROPIUM BROMIDE AND ALBUTEROL SULFATE 1 DOSE: 2.5; .5 SOLUTION RESPIRATORY (INHALATION) at 15:41

## 2023-11-09 RX ADMIN — IBUPROFEN 600 MG: 400 TABLET, FILM COATED ORAL at 16:14

## 2023-11-09 RX ADMIN — SODIUM CHLORIDE, PRESERVATIVE FREE 10 ML: 5 INJECTION INTRAVENOUS at 20:39

## 2023-11-09 RX ADMIN — ONDANSETRON 4 MG: 2 INJECTION INTRAMUSCULAR; INTRAVENOUS at 16:14

## 2023-11-09 RX ADMIN — PANTOPRAZOLE SODIUM 40 MG: 40 TABLET, DELAYED RELEASE ORAL at 06:10

## 2023-11-09 RX ADMIN — POTASSIUM CHLORIDE 40 MEQ: 1500 TABLET, EXTENDED RELEASE ORAL at 16:14

## 2023-11-09 RX ADMIN — KETOROLAC TROMETHAMINE 15 MG: 30 INJECTION, SOLUTION INTRAMUSCULAR; INTRAVENOUS at 06:10

## 2023-11-09 RX ADMIN — KETOROLAC TROMETHAMINE 15 MG: 30 INJECTION, SOLUTION INTRAMUSCULAR; INTRAVENOUS at 01:06

## 2023-11-09 RX ADMIN — COLCHICINE 0.6 MG: 0.6 TABLET ORAL at 08:52

## 2023-11-09 RX ADMIN — CEFDINIR 300 MG: 300 CAPSULE ORAL at 08:52

## 2023-11-09 RX ADMIN — ONDANSETRON 4 MG: 2 INJECTION INTRAMUSCULAR; INTRAVENOUS at 11:54

## 2023-11-09 RX ADMIN — CEFDINIR 300 MG: 300 CAPSULE ORAL at 20:39

## 2023-11-09 RX ADMIN — IPRATROPIUM BROMIDE AND ALBUTEROL SULFATE 1 DOSE: 2.5; .5 SOLUTION RESPIRATORY (INHALATION) at 11:16

## 2023-11-09 RX ADMIN — FUROSEMIDE 20 MG: 10 INJECTION, SOLUTION INTRAMUSCULAR; INTRAVENOUS at 16:14

## 2023-11-09 RX ADMIN — SODIUM CHLORIDE, PRESERVATIVE FREE 10 ML: 5 INJECTION INTRAVENOUS at 08:52

## 2023-11-09 RX ADMIN — ENOXAPARIN SODIUM 30 MG: 100 INJECTION SUBCUTANEOUS at 08:43

## 2023-11-09 ASSESSMENT — PAIN SCALES - GENERAL: PAINLEVEL_OUTOF10: 0

## 2023-11-09 NOTE — PROGRESS NOTES
Physical Therapy  Facility/Department: [facility] ICU  Physical Therapy Initial Assessment    Name: Barbara Bowen  : 1940  MRN: [id number]  Date of Service: 2023    Discharge Recommendations:  Home with Home health PT, 24 hour supervision or assist        Functional Outcome Measure:    Acute Care Index of Function (ACIF):    Score: 0.82 (0.71 or greater = appropriate for home with home PT and 24 hour supervision/assist)    Patient Diagnosis(es): The primary encounter diagnosis was Chest pain, unspecified type. Diagnoses of Hypotension, unspecified hypotension type and Shock ([address]) were also pertinent to this visit. Past Medical History:  has a past medical history of Arthritis, Family history of coronary artery disease, H/O echocardiogram, Hernia, hiatal, History of cardiovascular stress test, History of complete electrocardiogram, History of echocardiogram, History of Holter monitoring, History of nuclear stress test, History of nuclear stress test, Hx of echocardiogram, Hypertension, IBS (irritable bowel syndrome), and LBBB (left bundle branch block). Past Surgical History:  has a past surgical history that includes Appendectomy (); Breast biopsy ( & ); Hysterectomy (); sinus surgery (); knee surgery (); Bladder surgery (2009); and Pacemaker insertion (Left, 2018). Assessment   Body Structures, Functions, Activity Limitations Requiring Skilled Therapeutic Intervention: Decreased functional mobility ; Decreased high-level IADLs;Decreased endurance;Decreased balance  Therapy Prognosis: Good  Decision Making: Medium Complexity  Clinical Presentation: evolving  Requires PT Follow-Up: Yes  Activity Tolerance  Activity Tolerance: Patient tolerated evaluation without incident     Plan   Physical Therapy Plan  General Plan: 3-5 times per week  Current Treatment Recommendations: Strengthening, ROM, Balance training, Functional mobility training, Transfer training, ADL/Self-care training, IADL training, Endurance training, Equipment evaluation, education, & procurement, Pain management, Gait training, Stair training, Neuromuscular re-education, Safety education & training, Patient/Caregiver education & training, Therapeutic activities, Positioning, Home exercise program  Safety Devices  Type of Devices:  All fall risk precautions in place, Patient at risk for falls, Call light within reach, Left in chair, Chair alarm in place, Gait belt, Nurse notified     Restrictions  Restrictions/Precautions  Restrictions/Precautions: General Precautions, Fall Risk     Subjective   General  Chart Reviewed: Yes  Patient assessed for rehabilitation services?: Yes  Family / Caregiver Present: No  Follows Commands: Within Functional Limits  Subjective  Subjective: pain: denies; 0/10         Social/Functional History  Social/Functional History  Lives With: Alone  Type of Home: House  Home Layout: One level  Home Access: Stairs to enter with rails  Entrance Stairs - Number of Steps: 3  Bathroom Shower/Tub: Tub/Shower unit  Bathroom Toilet: Handicap height  Bathroom Equipment: Grab bars in shower, Shower chair  Home Equipment: [employer] [place], [address] [place] Help From: Family  ADL Assistance: Independent  Ambulation Assistance: Independent  Transfer Assistance: Independent  Active : Yes    Vision/Hearing  Vision  Vision: Within Functional Limits  Hearing  Hearing: Within functional limits      Cognition   Orientation  Overall Orientation Status: Within Functional Limits  Cognition  Overall Cognitive Status: WFL     Objective   Pulse: 70  Heart Rate Source: Monitor  BP: (!) 121/43  BP Location: Left upper arm  BP Method: Automatic  Patient Position: Semi fowlers  MAP (Calculated): 69  Respirations: 27  SpO2: 94 %        Gross Assessment  Sensation: Intact (BLEs)        Strength RLE  Strength RLE: WFL  Strength LLE  Strength LLE: WFL           Bed mobility  Supine to Sit: Independent  Sit to

## 2023-11-09 NOTE — CONSULTS
[address], 1940, 2126/2126-A, 11/9/2023      Discharge Recommendation: [address] OT     History:  [place]:  The primary encounter diagnosis was Chest pain, unspecified type. Diagnoses of Hypotension, unspecified hypotension type and Shock ([address]) were also pertinent to this visit. Past Medical History:   Diagnosis Date    Arthritis     Family history of coronary artery disease     H/O echocardiogram 04/13/2015    EF 55% Normal chamber sizes Left ventricular hypertrophy with normal LV systolic, but abnormal diastolic. Mild TR and aortic insufficiencies with mild pulm HTN. Mild-mod mitral regurg. Hernia, hiatal     History of cardiovascular stress test 01/23/2012    The post stress myocardial perfusion images show a normal pattern of perfusion in all regions. The post stress left ventricle is normal in size. Theres no scintigraphic evidence of inducible myocardial ischemia. No wall motion abnormalities seen. No previous study to compare with. The EF is 70%. Normal myocardial perfusion study. History of complete electrocardiogram 01/23/2012    History of echocardiogram 01/24/2012    Theres mild asymmetric left ventricular hypertrophy. Left ventricular systolic function is normal. EF is >55%. Theres mild mitral regurg. noted. Theres mild tricuspid regurg. noted. Mild aortic regurg. noted. History of Holter monitoring 02/09/2017    Sinus rhythm 48 hr holter     History of nuclear stress test 04/13/2015    EF 70%. WNL    History of nuclear stress test 03/04/2020    EF >70%, Most likely normal test, Apical thinning seen is probably physiological    Hx of echocardiogram 07/14/2021    55-60%. Mild left ventricular hypertrophy. Mild mitral, tricuspid ,pulmonic and moderate aortic regurgitation is    Hypertension     IBS (irritable bowel syndrome)     LBBB (left bundle branch block)          Subjective:  Patient states: \"You will find I am Unable  Dep A Lot  Max A A Lot   Mod A A Little  Min A A Little   CGA  SBA None   Mod I  Indep  Sup   1. Putting on and taking off regular lower body clothing? [] 1    [] 2   [] 2   [] 3   [x] 3   [] 4      2. Bathing (including washing, rinsing, drying)? [] 1   [] 2   [] 2 [] 3 [x] 3 [] 4   3. Toileting, which includes using toilet, bedpan, or urinal? [] 1    [] 2   [] 2   [] 3   [x] 3   [] 4     4. Putting on and taking off regular upper body clothing? [] 1   [] 2   [] 2   [] 3   [x] 3    [] 4      5. Taking care of personal grooming such as brushing teeth? [] 1   [] 2    [] 2 [] 3    [x] 3   [] 4      6. Eating meals? [] 1   [] 2   [] 2   [] 3   [x] 3   [] 4        Raw Score:  18      24/24 = unimpaired  23/24 = 1-20% impaired   20/24-22/24 = 21-40% impaired  15/24-19/24 = 41-59% impaired   10/24-14/24 = 60%-79% impaired  7/24-9/24 = 80%-99% impaired  6/24 = 100% impaired     Treatment:  Therapeutic Activity Training:   Therapeutic activity training was instructed today. Cues were given for safety, sequence, UE/LE placement, visual cues, and balance. Activities performed today included sup to sit, scooting, seated balance, transfers, standing balance, ambulation. Education: Role of OT, OT POC, discharge needs, safety, benefits of EOB/OOB activity, AE needs, home safety, use of FWW when returning home    Safety Measures: Gait belt used for safety of pt and therapist, Left in recliner, Alarm in place, call light and phone within reach, lines managed, needs met     Assessment:  Pt is a 79 yr old female from home who presents with hypotension. Prior to admission, pt was independent and ambulated without AD. Pt currently performed bed mobility SBA, transfers CGA, and ambulated HH distances CGA with FWW. Pt would benefit from continued acute care OT services during their stay, and discharge to Home with [address] [facility] OT.     Complexity: Moderate   Prognosis: Good   Barriers: strength, endurance     Plan:  Plan:

## 2023-11-09 NOTE — CONSULTS
Consult completed. Procedure/rationale explained to patient & consent obtained. #18Ga Introcan Safety Deep Access EDPIV initiated to RUE Basilic Vein using sterile, UltraSound-guided technique without difficulty/complications. Positioning verified via UltraSound visualization of catheter within vessel lumen; site returns blood briskly and flushes without resistance/abnormalities. Sterile dressing with SkinPrep, CHG gel DSSG, SwabCap, and Limb Precautions band applied. PIV removed. Pt tolerated well & no other c/o or needs noted or reported. Primary RN notified.

## 2023-11-09 NOTE — PROGRESS NOTES
V2.0  [facility] [place]      Name:  Shaina Galdamez /Age/Sex: 1940  (79 y.o. female)   MRN & CSN:  [id number] & [id number] Encounter Date/Time: 2023 8:57 AM EST    Location:  -A PCP: Kayce Lucia MD       Hospital Day: 4      Subjective:     Chief Complaint:  Chest Pain (4 hours/)    No longer ICU; now step down. Was started on Xanax 0.25 BID PRN by ICU. Patient seen and examined at bedside. Feels her SOB has improved today. Feels weak PT/OT consulted. Chest pain is improved. Discussed the need to change toradol IV to ibuprofen given the risks. No n/v/d. Assessment and Plan:   Chest pain. Likely 2/2 pericarditis; maybe pain 2/2 PNA. ECHO confirmed moderate pericardial effusion. No sign of cardiac tamponade. ECG no diffuse ST elevations. CRP, sed rate elevated  Started on NSAID and colchicine with improvement in symptoms. - continue colchicine  - change Toradol to ibuprofen 400 mg TID  - cardiology consulted; appreciate recs  - treated for PNA as below      Acute hypoxic respiratory failure - resolved. 2/2 suspected community acquired pneumonia. No symptoms aside from chest pain. CT Chest showed bilateral consolidating infiltrates. No sign of sepsis but came w WBC 11.3 ->15.0 -> 10.1. Resp viral panel negative. Strep, legionella negative. Azithromycin d/sherri  Weaned off O2 to room air today. Was on rocephin now changed to cefdnir; continue total 7 days  Cards ordered dose of lasix 20 mg IV     Hypotension - resolved. Received IVF and albumin requiring pressors. No sign of sepsis except leukocytosis. No sign of cardiac tamponade per ECHO. Could be 2/2 fentanyl doses. Pt was on levophed now off.   - management as above  - cardiology following     Macrocytic anemia. Hgb has been trending down today 8.4 (was 8.6 yesterday) from 12.5 on admission.   - iron panel, ferritin, vit-b12 and folate ordered    Complete heart block.  S/p PPM. Interrogated. Cards following      Personally reviewed Lab Studies and Imaging     Drugs that require monitoring for toxicity include lovenox and the method of monitoring was cbc      Diet ADULT DIET; Regular   DVT Prophylaxis [x] Lovenox, []  Heparin, [] SCDs, [] Ambulation,  [] Eliquis, [] Xarelto  [] Coumadin   Code Status Full Code   Disposition From: home  Expected Disposition: home  Estimated Date of Discharge: medically stable  Patient requires continued admission due to await PT/OT possible placement   Surrogate Decision Maker/ POA      Review of Systems:    Review of Systems    See above    Objective: Intake/Output Summary (Last 24 hours) at 11/9/2023 1249  Last data filed at 11/8/2023 1900  Gross per 24 hour   Intake 480 ml   Output --   Net 480 ml        Vitals:   Vitals:    11/09/23 1116   BP:    Pulse: 79   Resp: 29   Temp:    SpO2: 93%       Physical Exam:     General: NAD  Eyes: EOMI  ENT: neck supple  Cardiovascular: Regular rate. Respiratory: bilateral rales  Gastrointestinal: Soft, non tender  Genitourinary: no suprapubic tenderness  Musculoskeletal: No edema  Skin: warm, dry  Neuro: Alert. Psych: Mood appropriate.      Medications:   Medications:    ibuprofen  600 mg Oral TID WC    ipratropium 0.5 mg-albuterol 2.5 mg  1 Dose Inhalation Q4H WA RT    furosemide  20 mg IntraVENous Once    potassium chloride  40 mEq Oral Once    cefdinir  300 mg Oral 2 times per day    sodium chloride flush  5-40 mL IntraVENous 2 times per day    enoxaparin  30 mg SubCUTAneous Daily    pantoprazole  40 mg Oral QAM AC    colchicine  0.6 mg Oral Daily      Infusions:    sodium chloride Stopped (11/06/23 2226)     PRN Meds: dicyclomine, 10 mg, TID PRN  ALPRAZolam, 0.25 mg, BID PRN  sodium chloride, 1 spray, Q4H PRN  ondansetron, 4 mg, Q30 Min PRN  sodium chloride flush, 5-40 mL, PRN  sodium chloride, , PRN  polyethylene glycol, 17 g, Daily PRN  acetaminophen, 650 mg, Q6H PRN   Or  acetaminophen, 650 mg, Q6H

## 2023-11-10 ENCOUNTER — APPOINTMENT (OUTPATIENT)
Dept: NON INVASIVE DIAGNOSTICS | Age: 83
End: 2023-11-10
Attending: INTERNAL MEDICINE
Payer: MEDICARE

## 2023-11-10 VITALS
DIASTOLIC BLOOD PRESSURE: 53 MMHG | OXYGEN SATURATION: 96 % | HEART RATE: 91 BPM | WEIGHT: 112 LBS | SYSTOLIC BLOOD PRESSURE: 126 MMHG | RESPIRATION RATE: 29 BRPM | HEIGHT: 60 IN | TEMPERATURE: 98.2 F | BODY MASS INDEX: 21.99 KG/M2

## 2023-11-10 LAB
ANION GAP SERPL CALCULATED.3IONS-SCNC: 10 MMOL/L (ref 4–16)
BASOPHILS ABSOLUTE: 0 K/CU MM
BASOPHILS RELATIVE PERCENT: 0.3 % (ref 0–1)
BUN SERPL-MCNC: 6 MG/DL (ref 6–23)
CALCIUM SERPL-MCNC: 7.8 MG/DL (ref 8.3–10.6)
CHLORIDE BLD-SCNC: 106 MMOL/L (ref 99–110)
CO2: 23 MMOL/L (ref 21–32)
CREAT SERPL-MCNC: 0.5 MG/DL (ref 0.6–1.1)
DIFFERENTIAL TYPE: ABNORMAL
ECHO BSA: 1.47 M2
ECHO LV FRACTIONAL SHORTENING: 37 % (ref 28–44)
ECHO LV INTERNAL DIMENSION DIASTOLE INDEX: 2.81 CM/M2
ECHO LV INTERNAL DIMENSION DIASTOLIC: 4.1 CM (ref 3.9–5.3)
ECHO LV INTERNAL DIMENSION SYSTOLIC INDEX: 1.78 CM/M2
ECHO LV INTERNAL DIMENSION SYSTOLIC: 2.6 CM
ECHO LV IVSD: 1.2 CM (ref 0.6–0.9)
ECHO LV MASS 2D: 161.4 G (ref 67–162)
ECHO LV MASS INDEX 2D: 110.5 G/M2 (ref 43–95)
ECHO LV POSTERIOR WALL DIASTOLIC: 1.1 CM (ref 0.6–0.9)
ECHO LV RELATIVE WALL THICKNESS RATIO: 0.54
EOSINOPHILS ABSOLUTE: 0.2 K/CU MM
EOSINOPHILS RELATIVE PERCENT: 2.9 % (ref 0–3)
FERRITIN: 324 NG/ML (ref 15–150)
FOLATE SERPL-MCNC: 18.9 NG/ML (ref 3.1–17.5)
GFR SERPL CREATININE-BSD FRML MDRD: >60 ML/MIN/1.73M2
GLUCOSE SERPL-MCNC: 88 MG/DL (ref 70–99)
HCT VFR BLD CALC: 28.8 % (ref 37–47)
HEMOGLOBIN: 9.2 GM/DL (ref 12.5–16)
IMMATURE NEUTROPHIL %: 0.4 % (ref 0–0.43)
IRON: 28 UG/DL (ref 37–145)
LYMPHOCYTES ABSOLUTE: 1.3 K/CU MM
LYMPHOCYTES RELATIVE PERCENT: 16.5 % (ref 24–44)
MAGNESIUM: 1.9 MG/DL (ref 1.8–2.4)
MCH RBC QN AUTO: 31.2 PG (ref 27–31)
MCHC RBC AUTO-ENTMCNC: 31.9 % (ref 32–36)
MCV RBC AUTO: 97.6 FL (ref 78–100)
MONOCYTES ABSOLUTE: 0.7 K/CU MM
MONOCYTES RELATIVE PERCENT: 9.1 % (ref 0–4)
NUCLEATED RBC %: 0 %
PCT TRANSFERRIN: 16 % (ref 10–44)
PDW BLD-RTO: 13.4 % (ref 11.7–14.9)
PLATELET # BLD: 309 K/CU MM (ref 140–440)
PMV BLD AUTO: 8.9 FL (ref 7.5–11.1)
POTASSIUM SERPL-SCNC: 3.5 MMOL/L (ref 3.5–5.1)
RBC # BLD: 2.95 M/CU MM (ref 4.2–5.4)
SEGMENTED NEUTROPHILS ABSOLUTE COUNT: 5.5 K/CU MM
SEGMENTED NEUTROPHILS RELATIVE PERCENT: 70.8 % (ref 36–66)
SODIUM BLD-SCNC: 139 MMOL/L (ref 135–145)
TOTAL IMMATURE NEUTOROPHIL: 0.03 K/CU MM
TOTAL IRON BINDING CAPACITY: 177 UG/DL (ref 250–450)
TOTAL NUCLEATED RBC: 0 K/CU MM
UNSATURATED IRON BINDING CAPACITY: 149 UG/DL (ref 110–370)
VITAMIN B-12: 1171 PG/ML (ref 211–911)
WBC # BLD: 7.8 K/CU MM (ref 4–10.5)

## 2023-11-10 PROCEDURE — 83540 ASSAY OF IRON: CPT

## 2023-11-10 PROCEDURE — 99233 SBSQ HOSP IP/OBS HIGH 50: CPT | Performed by: INTERNAL MEDICINE

## 2023-11-10 PROCEDURE — 6370000000 HC RX 637 (ALT 250 FOR IP): Performed by: STUDENT IN AN ORGANIZED HEALTH CARE EDUCATION/TRAINING PROGRAM

## 2023-11-10 PROCEDURE — 82607 VITAMIN B-12: CPT

## 2023-11-10 PROCEDURE — 93308 TTE F-UP OR LMTD: CPT

## 2023-11-10 PROCEDURE — 85025 COMPLETE CBC W/AUTO DIFF WBC: CPT

## 2023-11-10 PROCEDURE — 83550 IRON BINDING TEST: CPT

## 2023-11-10 PROCEDURE — 82728 ASSAY OF FERRITIN: CPT

## 2023-11-10 PROCEDURE — 6360000002 HC RX W HCPCS: Performed by: STUDENT IN AN ORGANIZED HEALTH CARE EDUCATION/TRAINING PROGRAM

## 2023-11-10 PROCEDURE — 80048 BASIC METABOLIC PNL TOTAL CA: CPT

## 2023-11-10 PROCEDURE — 2580000003 HC RX 258: Performed by: STUDENT IN AN ORGANIZED HEALTH CARE EDUCATION/TRAINING PROGRAM

## 2023-11-10 PROCEDURE — 82746 ASSAY OF FOLIC ACID SERUM: CPT

## 2023-11-10 PROCEDURE — 83735 ASSAY OF MAGNESIUM: CPT

## 2023-11-10 PROCEDURE — 6360000002 HC RX W HCPCS: Performed by: INTERNAL MEDICINE

## 2023-11-10 RX ORDER — POTASSIUM CHLORIDE 20 MEQ/1
20 TABLET, EXTENDED RELEASE ORAL DAILY
Qty: 30 TABLET | Refills: 0 | Status: SHIPPED | OUTPATIENT
Start: 2023-11-10 | End: 2023-12-10

## 2023-11-10 RX ORDER — FUROSEMIDE 10 MG/ML
20 INJECTION INTRAMUSCULAR; INTRAVENOUS ONCE
Status: COMPLETED | OUTPATIENT
Start: 2023-11-10 | End: 2023-11-10

## 2023-11-10 RX ORDER — CEFDINIR 300 MG/1
300 CAPSULE ORAL EVERY 12 HOURS SCHEDULED
Qty: 1 CAPSULE | Refills: 0 | Status: SHIPPED | OUTPATIENT
Start: 2023-11-10 | End: 2023-11-11

## 2023-11-10 RX ORDER — IBUPROFEN 600 MG/1
600 TABLET ORAL EVERY 8 HOURS PRN
Qty: 30 TABLET | Refills: 0 | Status: SHIPPED | OUTPATIENT
Start: 2023-11-10 | End: 2023-11-10 | Stop reason: SDUPTHER

## 2023-11-10 RX ORDER — COLCHICINE 0.6 MG/1
0.6 TABLET ORAL DAILY
Qty: 85 TABLET | Refills: 0 | Status: SHIPPED | OUTPATIENT
Start: 2023-11-11 | End: 2024-02-04

## 2023-11-10 RX ORDER — FERROUS SULFATE 325(65) MG
325 TABLET ORAL
Qty: 180 TABLET | Refills: 1 | Status: SHIPPED | OUTPATIENT
Start: 2023-11-10

## 2023-11-10 RX ORDER — FUROSEMIDE 40 MG/1
40 TABLET ORAL DAILY
Qty: 30 TABLET | Refills: 0 | Status: SHIPPED | OUTPATIENT
Start: 2023-11-10 | End: 2023-12-10

## 2023-11-10 RX ORDER — FUROSEMIDE 10 MG/ML
40 INJECTION INTRAMUSCULAR; INTRAVENOUS ONCE
Status: COMPLETED | OUTPATIENT
Start: 2023-11-10 | End: 2023-11-10

## 2023-11-10 RX ORDER — IBUPROFEN 400 MG/1
400 TABLET ORAL EVERY 8 HOURS PRN
Qty: 30 TABLET | Refills: 0 | Status: SHIPPED | OUTPATIENT
Start: 2023-11-10 | End: 2023-11-20

## 2023-11-10 RX ADMIN — SODIUM CHLORIDE, PRESERVATIVE FREE 10 ML: 5 INJECTION INTRAVENOUS at 09:39

## 2023-11-10 RX ADMIN — COLCHICINE 0.6 MG: 0.6 TABLET ORAL at 09:32

## 2023-11-10 RX ADMIN — CEFDINIR 300 MG: 300 CAPSULE ORAL at 09:32

## 2023-11-10 RX ADMIN — PANTOPRAZOLE SODIUM 40 MG: 40 TABLET, DELAYED RELEASE ORAL at 06:03

## 2023-11-10 RX ADMIN — ENOXAPARIN SODIUM 30 MG: 100 INJECTION SUBCUTANEOUS at 09:32

## 2023-11-10 RX ADMIN — FUROSEMIDE 40 MG: 10 INJECTION, SOLUTION INTRAMUSCULAR; INTRAVENOUS at 09:38

## 2023-11-10 RX ADMIN — FUROSEMIDE 20 MG: 10 INJECTION, SOLUTION INTRAMUSCULAR; INTRAVENOUS at 09:39

## 2023-11-10 RX ADMIN — IBUPROFEN 600 MG: 400 TABLET, FILM COATED ORAL at 09:40

## 2023-11-10 NOTE — DISCHARGE INSTRUCTIONS
- please schedule an appointment to see your PCP  - please schedule an appointment to see cardiology and GI  - please go to lab in one week for blood work  - please take medications as prescribed  - please monitor your blood pressure. And discuss with your PCP what blood pressure medication you should resume.

## 2023-11-10 NOTE — PROGRESS NOTES
Outpatient Pharmacy Progress Note for Meds-to-Beds    Total number of Prescriptions Filled: 6  The following medications were dispensed to the patient during the discharge process:  Ibuprofen  Ferrous sulfate  Potassium chloride  Furosemide   Colchicine  cefdinir    Additional Documentation:  Patient picked-up the medication(s) in the OP Pharmacy  [facility] out of stock- called 3 other pharmacies and not in stock. Message sent to prescriber. Will order for Monday 11/13/23. Patient aware. Thank you for letting us serve your patients.   [address]    [address] [place] [address], [address] [place] [address]    Phone: [phone]    Fax: [phone]

## 2023-11-10 NOTE — DISCHARGE SUMMARY
Discharge Summary    Name:  Maya Ansari /Age/Sex: 1940  (79 y.o. female)   MRN & CSN:  [id number] & [id number] Admission Date/Time: 2023  3:34 AM   Attending:  Shay Sanz MD Discharging Physician: Shay Sanz MD     Hospital Course:   Maya Ansari is a 79 y.o.  female  who presents with Hypotension    HPI  Maya Ansari is a 79 y.o. female with PMH of HTN, complete heart block s/p PPM, CAD who presents with chest pain started 11pm the night before admission. Described as burning across cehst. Took Maalox and Pepto-Bismol no help. Denies SOB, or cough. Pt had CTA Chest ruled out dissection or PE but showed pericardial effusion. Received few fentanyl doses became hypotensive not improved despite IVF and albumin afterward started on pressors. Pt was transferred to ICU. Patient seen and examined at bedside. States her chest pain has almost resolved. Denies SOB. No lightheadedness or dizziness. The following problems have been addressed during this hospitalization. Chest pain. Likely 2/2 pericarditis; maybe pain 2/2 PNA and volume overload. ECHO confirmed moderate pericardial effusion. No sign of cardiac tamponade. ECG no diffuse ST elevations. CRP, sed rate elevated  Started on NSAID and colchicine with improvement in symptoms. - pt to continue colchicine 3 months total  - was on Toradol changed to to ibuprofen 400 mg TID  - cardiology consulted  - had repeat ECHO day of discharge; only trivial pericardial effusion noted  - treated for PNA as below      Acute hypoxic respiratory failure - resolved. 2/2 suspected community acquired pneumonia. Maybe volume overload given BNP 4K and repeat CXR showing pulmonary congestion. No symptoms aside from chest pain. CT Chest on admission showed bilateral consolidating infiltrates. No sign of sepsis but came w WBC 11.3 ->15.0 -> 10.1. Resp viral panel negative. Strep, legionella negative.  Azithromycin Writer faxed Rx [facility] number on Rx sheet as well as [facility] to number below. Both done 3/30/2022 at 9:55am   Labs     11/08/23  0606 11/09/23  0610 11/10/23  0855    138 139   K 3.9 3.3* 3.5    106 106   CO2 19* 22 23   BUN 8 5* 6   CREATININE 0.6 0.5* 0.5*   WBC 10.1 7.4 7.8   HCT 27.9* 26.0* 28.8*    243 309       IMAGING:      Additional Information: Patient seen and examined day of discharge.  For more information regarding patient's care please contact [address] [place] [address] [phone]    Discharge Time of 35 minutes    Electronically signed by Conrad Lubin MD on 11/10/2023 at 11:23 AM

## 2023-11-10 NOTE — PROGRESS NOTES
Discharge education reviewed. Questions answered. Medications clarified. Belongings gathered. Discharge instructions signed. All belongings accounted for. Patient discharged to home via sister. Patient refused home care and did not want to speak with CM for referral. She stated she had resources at home.

## 2023-11-11 LAB
CULTURE: NORMAL
CULTURE: NORMAL
Lab: NORMAL
Lab: NORMAL
SPECIMEN: NORMAL
SPECIMEN: NORMAL

## 2023-11-13 LAB
ECHO BSA: 1.47 M2
ECHO LV FRACTIONAL SHORTENING: 37 % (ref 28–44)
ECHO LV INTERNAL DIMENSION DIASTOLE INDEX: 2.81 CM/M2
ECHO LV INTERNAL DIMENSION DIASTOLIC: 4.1 CM (ref 3.9–5.3)
ECHO LV INTERNAL DIMENSION SYSTOLIC INDEX: 1.78 CM/M2
ECHO LV INTERNAL DIMENSION SYSTOLIC: 2.6 CM
ECHO LV IVSD: 1.2 CM (ref 0.6–0.9)
ECHO LV MASS 2D: 161.4 G (ref 67–162)
ECHO LV MASS INDEX 2D: 110.5 G/M2 (ref 43–95)
ECHO LV POSTERIOR WALL DIASTOLIC: 1.1 CM (ref 0.6–0.9)
ECHO LV RELATIVE WALL THICKNESS RATIO: 0.54

## 2023-11-16 LAB
ALBUMIN SERPL-MCNC: 4.6 G/DL
ALP BLD-CCNC: 62 U/L
ALT SERPL-CCNC: 17 U/L
ANION GAP SERPL CALCULATED.3IONS-SCNC: 1.8 MMOL/L
AST SERPL-CCNC: 18 U/L
BASOPHILS ABSOLUTE: 0.1 /ΜL
BASOPHILS RELATIVE PERCENT: 0.8 %
BILIRUB SERPL-MCNC: 0.3 MG/DL (ref 0.1–1.4)
BUN BLDV-MCNC: 9 MG/DL
CALCIUM SERPL-MCNC: 10.1 MG/DL
CHLORIDE BLD-SCNC: 100 MMOL/L
CO2: 26 MMOL/L
CREAT SERPL-MCNC: 0.7 MG/DL
EGFR: NORMAL
EOSINOPHILS ABSOLUTE: 0.2 /ΜL
EOSINOPHILS RELATIVE PERCENT: 2.6 %
GLUCOSE BLD-MCNC: 94 MG/DL
HCT VFR BLD CALC: 33.6 % (ref 36–46)
HEMOGLOBIN: 11.3 G/DL (ref 12–16)
LYMPHOCYTES ABSOLUTE: 1.8 /ΜL
LYMPHOCYTES RELATIVE PERCENT: 25.2 %
MCH RBC QN AUTO: 31.4 PG
MCHC RBC AUTO-ENTMCNC: 33.6 G/DL
MCV RBC AUTO: 93.3 FL
MONOCYTES ABSOLUTE: 0.8 /ΜL
MONOCYTES RELATIVE PERCENT: 11.1 %
NEUTROPHILS ABSOLUTE: 4.4 /ΜL
NEUTROPHILS RELATIVE PERCENT: 59.8 %
PDW BLD-RTO: 12.6 %
PLATELET # BLD: 577 K/ΜL
PMV BLD AUTO: 9.3 FL
POTASSIUM SERPL-SCNC: 4.4 MMOL/L
RBC # BLD: 3.6 10^6/ΜL
SODIUM BLD-SCNC: 141 MMOL/L
TOTAL PROTEIN: 7.2
WBC # BLD: 7.3 10^3/ML

## 2023-11-27 ENCOUNTER — OFFICE VISIT (OUTPATIENT)
Dept: CARDIOLOGY CLINIC | Age: 83
End: 2023-11-27
Payer: MEDICARE

## 2023-11-27 VITALS
WEIGHT: 109 LBS | HEIGHT: 60 IN | DIASTOLIC BLOOD PRESSURE: 64 MMHG | BODY MASS INDEX: 21.4 KG/M2 | SYSTOLIC BLOOD PRESSURE: 142 MMHG

## 2023-11-27 DIAGNOSIS — I45.9 HEART BLOCK: ICD-10-CM

## 2023-11-27 DIAGNOSIS — I10 PRIMARY HYPERTENSION: ICD-10-CM

## 2023-11-27 DIAGNOSIS — I44.7 LBBB (LEFT BUNDLE BRANCH BLOCK): ICD-10-CM

## 2023-11-27 DIAGNOSIS — N39.0 URINARY TRACT INFECTION WITHOUT HEMATURIA, SITE UNSPECIFIED: ICD-10-CM

## 2023-11-27 DIAGNOSIS — R55 SYNCOPE, UNSPECIFIED SYNCOPE TYPE: ICD-10-CM

## 2023-11-27 DIAGNOSIS — R00.1 BRADYCARDIA: ICD-10-CM

## 2023-11-27 DIAGNOSIS — Z95.0 PACEMAKER: ICD-10-CM

## 2023-11-27 DIAGNOSIS — Z95.0 CARDIAC PACEMAKER IN SITU: Primary | ICD-10-CM

## 2023-11-27 DIAGNOSIS — I44.2 COMPLETE HEART BLOCK (HCC): ICD-10-CM

## 2023-11-27 DIAGNOSIS — I38 VHD (VALVULAR HEART DISEASE): ICD-10-CM

## 2023-11-27 PROCEDURE — 1111F DSCHRG MED/CURRENT MED MERGE: CPT | Performed by: INTERNAL MEDICINE

## 2023-11-27 PROCEDURE — 1123F ACP DISCUSS/DSCN MKR DOCD: CPT | Performed by: INTERNAL MEDICINE

## 2023-11-27 PROCEDURE — 99214 OFFICE O/P EST MOD 30 MIN: CPT | Performed by: INTERNAL MEDICINE

## 2023-11-27 PROCEDURE — G8400 PT W/DXA NO RESULTS DOC: HCPCS | Performed by: INTERNAL MEDICINE

## 2023-11-27 PROCEDURE — G8484 FLU IMMUNIZE NO ADMIN: HCPCS | Performed by: INTERNAL MEDICINE

## 2023-11-27 PROCEDURE — 3077F SYST BP >= 140 MM HG: CPT | Performed by: INTERNAL MEDICINE

## 2023-11-27 PROCEDURE — 1090F PRES/ABSN URINE INCON ASSESS: CPT | Performed by: INTERNAL MEDICINE

## 2023-11-27 PROCEDURE — 1036F TOBACCO NON-USER: CPT | Performed by: INTERNAL MEDICINE

## 2023-11-27 PROCEDURE — G8427 DOCREV CUR MEDS BY ELIG CLIN: HCPCS | Performed by: INTERNAL MEDICINE

## 2023-11-27 PROCEDURE — G8420 CALC BMI NORM PARAMETERS: HCPCS | Performed by: INTERNAL MEDICINE

## 2023-11-27 PROCEDURE — 3078F DIAST BP <80 MM HG: CPT | Performed by: INTERNAL MEDICINE

## 2023-11-27 RX ORDER — CEFDINIR 300 MG/1
CAPSULE ORAL
COMMUNITY
Start: 2023-11-16

## 2023-11-27 NOTE — PROGRESS NOTES
Pooja Whittaker MD        OFFICE  FOLLOWUP NOTE    Chief complaints: patient is here for management of PPM, PERCIARDIAL EFFUSION, CHF, UTI, GERD, pneumonia    Subjective: patient feels tired no chest pain, no shortness of breath, no dizziness, no palpitations    HPI Anshu Leach is a 79 y. o.year old who  has a past medical history of Arthritis, Family history of coronary artery disease, H/O echocardiogram, Hernia, hiatal, History of cardiovascular stress test, History of complete electrocardiogram, History of echocardiogram, History of Holter monitoring, History of nuclear stress test, History of nuclear stress test, Hx of echocardiogram, Hypertension, IBS (irritable bowel syndrome), and LBBB (left bundle branch block). and presents for management of CAD, DM, HTN, AFIB, which are well controlled      Current Outpatient Medications   Medication Sig Dispense Refill    cefdinir (OMNICEF) 300 MG capsule TAKE 1 CAPSULE BY MOUTH EVERY 12 HOURS FOR 5 DAYS      colchicine (COLCRYS) 0.6 MG tablet Take 1 tablet by mouth daily 85 tablet 0    furosemide (LASIX) 40 MG tablet Take 1 tablet by mouth daily (Patient taking differently: Take 1 tablet by mouth every other day) 30 tablet 0    potassium chloride (KLOR-CON M) 20 MEQ extended release tablet Take 1 tablet by mouth daily 30 tablet 0    ferrous sulfate (IRON 325) 325 (65 Fe) MG tablet Take 1 tablet by mouth daily (with breakfast) 180 tablet 1    ibuprofen (ADVIL;MOTRIN) 400 MG tablet Take 1 tablet by mouth every 8 hours as needed for Pain Can skip or hold if having no chest pain anymore. 30 tablet 0    Probiotic Product (PROBIOTIC DAILY PO) Take  by mouth daily. esomeprazole (NEXIUM) 40 MG delayed release capsule Take 1 capsule by mouth daily      Calcium Carb-Cholecalciferol (CALCIUM 1000 + D PO) Take 1,000 mg by mouth daily.         therapeutic multivitamin-minerals (THERAGRAN-M) tablet Take 1 tablet by mouth daily      potassium phosphate, monobasic,

## 2024-01-05 ENCOUNTER — OFFICE VISIT (OUTPATIENT)
Dept: CARDIOLOGY CLINIC | Age: 84
End: 2024-01-05

## 2024-01-05 VITALS
SYSTOLIC BLOOD PRESSURE: 152 MMHG | WEIGHT: 108 LBS | HEART RATE: 72 BPM | DIASTOLIC BLOOD PRESSURE: 64 MMHG | HEIGHT: 60 IN | BODY MASS INDEX: 21.2 KG/M2

## 2024-01-05 DIAGNOSIS — I44.2 COMPLETE HEART BLOCK (HCC): ICD-10-CM

## 2024-01-05 DIAGNOSIS — R55 SYNCOPE, UNSPECIFIED SYNCOPE TYPE: ICD-10-CM

## 2024-01-05 DIAGNOSIS — R00.1 BRADYCARDIA: ICD-10-CM

## 2024-01-05 DIAGNOSIS — N39.0 URINARY TRACT INFECTION WITHOUT HEMATURIA, SITE UNSPECIFIED: ICD-10-CM

## 2024-01-05 DIAGNOSIS — Z95.0 PACEMAKER: ICD-10-CM

## 2024-01-05 DIAGNOSIS — Z95.0 CARDIAC PACEMAKER IN SITU: Primary | ICD-10-CM

## 2024-01-05 DIAGNOSIS — I45.9 HEART BLOCK: ICD-10-CM

## 2024-01-05 DIAGNOSIS — I44.7 LBBB (LEFT BUNDLE BRANCH BLOCK): ICD-10-CM

## 2024-01-05 RX ORDER — CANDESARTAN 32 MG/1
32 TABLET ORAL DAILY
COMMUNITY

## 2024-01-05 RX ORDER — ATENOLOL 25 MG/1
25 TABLET ORAL DAILY
COMMUNITY

## 2024-01-05 NOTE — PROGRESS NOTES
Satinder Hylton MD        OFFICE  FOLLOWUP NOTE    Chief complaints: patient is here for management of  PPM, PERCIARDIAL EFFUSION, CHF, UTI, GERD, pneumonia     Subjective: patient feels better, left sided rib cage chest pain, no shortness of breath, no dizziness, no palpitations    HPI Noemi is a 83 y.o.year old who  has a past medical history of Arthritis, Family history of coronary artery disease, H/O echocardiogram, Hernia, hiatal, History of cardiovascular stress test, History of complete electrocardiogram, History of echocardiogram, History of Holter monitoring, History of nuclear stress test, History of nuclear stress test, Hx of echocardiogram, Hypertension, IBS (irritable bowel syndrome), and LBBB (left bundle branch block). and presents for management of  PPM, PERCIARDIAL EFFUSION, CHF, UTI, GERD, pneumonia  which are well controlled      Current Outpatient Medications   Medication Sig Dispense Refill    atenolol (TENORMIN) 25 MG tablet Take 1 tablet by mouth daily      candesartan (ATACAND) 32 MG tablet Take 1 tablet by mouth daily      Probiotic Product (PROBIOTIC DAILY PO) Take  by mouth daily.      esomeprazole (NEXIUM) 40 MG delayed release capsule Take 1 capsule by mouth daily      Calcium Carb-Cholecalciferol (CALCIUM 1000 + D PO) Take 1,000 mg by mouth daily.        therapeutic multivitamin-minerals (THERAGRAN-M) tablet Take 1 tablet by mouth daily      cefdinir (OMNICEF) 300 MG capsule TAKE 1 CAPSULE BY MOUTH EVERY 12 HOURS FOR 5 DAYS      colchicine (COLCRYS) 0.6 MG tablet Take 1 tablet by mouth daily (Patient not taking: Reported on 1/5/2024) 85 tablet 0    ferrous sulfate (IRON 325) 325 (65 Fe) MG tablet Take 1 tablet by mouth daily (with breakfast) (Patient not taking: Reported on 1/5/2024) 180 tablet 1    ibuprofen (ADVIL;MOTRIN) 400 MG tablet Take 1 tablet by mouth every 8 hours as needed for Pain Can skip or hold if having no chest pain anymore. 30 tablet 0    potassium

## 2024-01-18 ENCOUNTER — OFFICE VISIT (OUTPATIENT)
Dept: GASTROENTEROLOGY | Age: 84
End: 2024-01-18
Payer: MEDICARE

## 2024-01-18 ENCOUNTER — HOSPITAL ENCOUNTER (OUTPATIENT)
Age: 84
Discharge: HOME | End: 2024-01-18
Payer: MEDICARE

## 2024-01-18 VITALS
SYSTOLIC BLOOD PRESSURE: 124 MMHG | TEMPERATURE: 97 F | WEIGHT: 107.4 LBS | DIASTOLIC BLOOD PRESSURE: 86 MMHG | BODY MASS INDEX: 21.09 KG/M2 | OXYGEN SATURATION: 97 % | HEIGHT: 60 IN | HEART RATE: 60 BPM

## 2024-01-18 DIAGNOSIS — R10.12 LEFT UPPER QUADRANT ABDOMINAL PAIN: Primary | ICD-10-CM

## 2024-01-18 DIAGNOSIS — R10.12 LEFT UPPER QUADRANT ABDOMINAL PAIN: ICD-10-CM

## 2024-01-18 LAB
ALBUMIN SERPL-MCNC: 4.9 GM/DL (ref 3.4–5)
ALP BLD-CCNC: 55 IU/L (ref 40–128)
ALT SERPL-CCNC: 9 U/L (ref 10–40)
ANION GAP SERPL CALCULATED.3IONS-SCNC: 13 MMOL/L (ref 7–16)
AST SERPL-CCNC: 16 IU/L (ref 15–37)
BILIRUB SERPL-MCNC: 0.3 MG/DL (ref 0–1)
BILIRUBIN URINE: NEGATIVE MG/DL
BLOOD, URINE: NEGATIVE
BUN SERPL-MCNC: 12 MG/DL (ref 6–23)
CALCIUM SERPL-MCNC: 9.7 MG/DL (ref 8.3–10.6)
CHLORIDE BLD-SCNC: 98 MMOL/L (ref 99–110)
CLARITY: CLEAR
CO2: 25 MMOL/L (ref 21–32)
COLOR: YELLOW
CREAT SERPL-MCNC: 0.7 MG/DL (ref 0.6–1.1)
CRP SERPL HS-MCNC: 3 MG/L
ERYTHROCYTE SEDIMENTATION RATE: 2 MM/HR (ref 0–30)
GFR SERPL CREATININE-BSD FRML MDRD: >60 ML/MIN/1.73M2
GLUCOSE SERPL-MCNC: 97 MG/DL (ref 70–99)
GLUCOSE, URINE: NEGATIVE MG/DL
HCT VFR BLD CALC: 40.4 % (ref 37–47)
HEMOGLOBIN: 12.8 GM/DL (ref 12.5–16)
KETONES, URINE: NEGATIVE MG/DL
LEUKOCYTE ESTERASE, URINE: ABNORMAL
MCH RBC QN AUTO: 30.9 PG (ref 27–31)
MCHC RBC AUTO-ENTMCNC: 31.7 % (ref 32–36)
MCV RBC AUTO: 97.6 FL (ref 78–100)
NITRITE URINE, QUANTITATIVE: NEGATIVE
PDW BLD-RTO: 13.2 % (ref 11.7–14.9)
PH, URINE: 6 (ref 5–8)
PLATELET # BLD: 328 K/CU MM (ref 140–440)
PMV BLD AUTO: 9.9 FL (ref 7.5–11.1)
POTASSIUM SERPL-SCNC: 4.3 MMOL/L (ref 3.5–5.1)
PROTEIN UA: NEGATIVE MG/DL
RBC # BLD: 4.14 M/CU MM (ref 4.2–5.4)
SODIUM BLD-SCNC: 136 MMOL/L (ref 135–145)
SPECIFIC GRAVITY UA: 1.01 (ref 1–1.03)
TOTAL PROTEIN: 7.6 GM/DL (ref 6.4–8.2)
UROBILINOGEN, URINE: 0.2 MG/DL (ref 0.2–1)
WBC # BLD: 7.4 K/CU MM (ref 4–10.5)

## 2024-01-18 PROCEDURE — 80053 COMPREHEN METABOLIC PANEL: CPT

## 2024-01-18 PROCEDURE — 86140 C-REACTIVE PROTEIN: CPT

## 2024-01-18 PROCEDURE — 3079F DIAST BP 80-89 MM HG: CPT | Performed by: SPECIALIST

## 2024-01-18 PROCEDURE — 99214 OFFICE O/P EST MOD 30 MIN: CPT | Performed by: SPECIALIST

## 2024-01-18 PROCEDURE — G8420 CALC BMI NORM PARAMETERS: HCPCS | Performed by: SPECIALIST

## 2024-01-18 PROCEDURE — 1036F TOBACCO NON-USER: CPT | Performed by: SPECIALIST

## 2024-01-18 PROCEDURE — 3074F SYST BP LT 130 MM HG: CPT | Performed by: SPECIALIST

## 2024-01-18 PROCEDURE — 85027 COMPLETE CBC AUTOMATED: CPT

## 2024-01-18 PROCEDURE — G8484 FLU IMMUNIZE NO ADMIN: HCPCS | Performed by: SPECIALIST

## 2024-01-18 PROCEDURE — 85652 RBC SED RATE AUTOMATED: CPT

## 2024-01-18 PROCEDURE — 81001 URINALYSIS AUTO W/SCOPE: CPT

## 2024-01-18 PROCEDURE — 36415 COLL VENOUS BLD VENIPUNCTURE: CPT

## 2024-01-18 PROCEDURE — G8427 DOCREV CUR MEDS BY ELIG CLIN: HCPCS | Performed by: SPECIALIST

## 2024-01-18 PROCEDURE — G8400 PT W/DXA NO RESULTS DOC: HCPCS | Performed by: SPECIALIST

## 2024-01-18 PROCEDURE — 1123F ACP DISCUSS/DSCN MKR DOCD: CPT | Performed by: SPECIALIST

## 2024-01-18 PROCEDURE — 1090F PRES/ABSN URINE INCON ASSESS: CPT | Performed by: SPECIALIST

## 2024-01-18 PROCEDURE — 81003 URINALYSIS AUTO W/O SCOPE: CPT

## 2024-01-18 RX ORDER — AMLODIPINE BESYLATE 5 MG/1
TABLET ORAL
COMMUNITY
Start: 2024-01-10 | End: 2024-01-18

## 2024-01-18 RX ORDER — AMLODIPINE BESYLATE 5 MG/1
5 TABLET ORAL DAILY
COMMUNITY

## 2024-01-18 NOTE — PROGRESS NOTES
Gastroenterology Consult Note  Ignacio Elizabeth MD      Reason for Consult:  abd pain  Primary Care / referring Physician:  Lary Min MD      History Obtained From:  patient    CC: abd pain    HISTORY OF PRESENT ILLNESS:          Was admitted with chest pain/ pneumonia in November-- Hb  12.5 on admission- went down to 8.4- then back to 11.3. yesterday had left flank pain that lasted 4 hours-- has had it before intermittently for past 3-4 years but not this bad. No diarrhea, constipation, melena, hematochezia    Past Medical History:        Diagnosis Date    Arthritis     Family history of coronary artery disease     H/O echocardiogram 04/13/2015    EF 55% Normal chamber sizes Left ventricular hypertrophy with normal LV systolic, but abnormal diastolic. Mild TR and aortic insufficiencies with mild pulm HTN. Mild-mod mitral regurg.     Hernia, hiatal     History of cardiovascular stress test 01/23/2012    The post stress myocardial perfusion images show a normal pattern of perfusion in all regions. The post stress left ventricle is normal in size.Theres no scintigraphic evidence of inducible myocardial ischemia. No wall motion abnormalities seen. No previous study to compare with. The EF is 70%. Normal myocardial perfusion study.     History of complete electrocardiogram 01/23/2012    History of echocardiogram 01/24/2012    Theres mild asymmetric left ventricular hypertrophy.Left ventricular systolic function is normal. EF is >55%.Theres mild mitral regurg. noted. Theres mild tricuspid regurg. noted.Mild aortic regurg. noted.     History of Holter monitoring 02/09/2017    Sinus rhythm 48 hr holter     History of nuclear stress test 04/13/2015    EF 70%. WNL    History of nuclear stress test 03/04/2020    EF >70%, Most likely normal test, Apical thinning seen is probably physiological    Hx of echocardiogram 07/14/2021    55-60%.Mild left ventricular hypertrophy.Mild mitral, tricuspid ,pulmonic and moderate

## 2024-02-06 ENCOUNTER — HOSPITAL ENCOUNTER (OUTPATIENT)
Dept: CT IMAGING | Age: 84
Discharge: HOME | End: 2024-02-06
Attending: SPECIALIST
Payer: MEDICARE

## 2024-02-06 DIAGNOSIS — R10.12 LEFT UPPER QUADRANT ABDOMINAL PAIN: ICD-10-CM

## 2024-02-06 PROCEDURE — 6360000004 HC RX CONTRAST MEDICATION: Performed by: SPECIALIST

## 2024-02-06 PROCEDURE — 2580000003 HC RX 258: Performed by: SPECIALIST

## 2024-02-06 PROCEDURE — 74177 CT ABD & PELVIS W/CONTRAST: CPT

## 2024-02-06 RX ORDER — SODIUM CHLORIDE 9 MG/ML
10 INJECTION INTRAVENOUS PRN
Status: DISCONTINUED | OUTPATIENT
Start: 2024-02-06 | End: 2024-02-07 | Stop reason: HOSPADM

## 2024-02-06 RX ADMIN — SODIUM CHLORIDE, PRESERVATIVE FREE 10 ML: 5 INJECTION INTRAVENOUS at 11:01

## 2024-02-06 RX ADMIN — IOPAMIDOL 75 ML: 755 INJECTION, SOLUTION INTRAVENOUS at 11:04

## 2024-02-06 RX ADMIN — IOPAMIDOL 18 ML: 755 INJECTION, SOLUTION INTRAVENOUS at 09:50

## 2024-02-12 ENCOUNTER — PROCEDURE VISIT (OUTPATIENT)
Dept: CARDIOLOGY CLINIC | Age: 84
End: 2024-02-12

## 2024-02-12 DIAGNOSIS — I45.9 HEART BLOCK: ICD-10-CM

## 2024-02-12 DIAGNOSIS — Z95.0 CARDIAC PACEMAKER IN SITU: Primary | ICD-10-CM

## 2024-03-06 NOTE — PROGRESS NOTES
Procedure @ [facility] on 3/13/24 you will be called 3/12/24 with times    NOTHING TO EAT OR DRINK AFTER MIDNIGHT DAY OF SURGERY  FOLLOW OFFICE INSTRUCTIONS FOR ANY NECESSARY PREP     1. Enter thru the hospital main entrance on day of surgery, check in at the Information Desk. If you arrive prior to 6:00am, enter thru the ER entrance.    2. Follow the directions as prescribed by the doctor for your procedure and medications.         Morning of surgery take:  Amlodipine, Atenolol, Nexium with sips of water          Stop vitamins, supplements and NSAIDS:  today     3. Check with your Doctor regarding stopping blood thinners and follow their instructions.    4. Do not smoke, vape or use chewing tobacco morning of surgery. Do not drink any alcoholic beverages 24 hours prior to surgery.       This includes NA Beer. No street drugs 7 days prior to surgery.    5. If you have dentures, contacts of glasses they will be removed before going to the OR; please bring a case.    6. Please bring picture ID, insurance card, paperwork from the doctor’s office (H & P, Consent, & card for implantable devices).    7. Take a shower with an antibacterial soap the night before surgery and the morning of surgery. Do not put anything on your skin      After your morning shower.    8. You will need a responsible adult to drive you home and check on you after surgery.

## 2024-03-12 ENCOUNTER — ANESTHESIA EVENT (OUTPATIENT)
Dept: ENDOSCOPY | Age: 84
End: 2024-03-12
Payer: MEDICARE

## 2024-03-12 NOTE — ANESTHESIA PRE PROCEDURE
Department of Anesthesiology  Preprocedure Note       Name:  Noemi Freeman   Age:  83 y.o.  :  1940                                          MRN:  [id number]         Date:  3/12/2024      Surgeon: Surgeon(s):  Ignacio Elizabeth MD    Procedure: Procedure(s):  COLONOSCOPY DIAGNOSTIC    Medications prior to admission:   Prior to Admission medications    Medication Sig Start Date End Date Taking? Authorizing Provider   psyllium (KONSYL) 28.3 % PACK Take 1 packet by mouth daily   Yes Chaitanya Frausto MD   amLODIPine (NORVASC) 5 MG tablet Take 1 tablet by mouth daily    Chaitanya Frausto MD   atenolol (TENORMIN) 25 MG tablet Take 1 tablet by mouth daily    Chaitanya Frausto MD   candesartan (ATACAND) 32 MG tablet Take 1 tablet by mouth daily    Chaitanya Frausto MD   ferrous sulfate (IRON 325) 325 (65 Fe) MG tablet Take 1 tablet by mouth daily (with breakfast)  Patient not taking: Reported on 3/6/2024 11/10/23   Andrew Schulz MD   Probiotic Product (PROBIOTIC DAILY PO) Take  by mouth daily.    ProviderChaitanya MD   esomeprazole (NEXIUM) 40 MG delayed release capsule Take 1 capsule by mouth daily    Chaitanya Frausto MD   Calcium Carb-Cholecalciferol (CALCIUM 1000 + D PO) Take 1,000 mg by mouth daily.      Chaitanya Frausto MD   therapeutic multivitamin-minerals (THERAGRAN-M) tablet Take 1 tablet by mouth daily    ProviderChaitanya MD       Current medications:    No current facility-administered medications for this encounter.     Current Outpatient Medications   Medication Sig Dispense Refill    psyllium (KONSYL) 28.3 % PACK Take 1 packet by mouth daily      amLODIPine (NORVASC) 5 MG tablet Take 1 tablet by mouth daily      atenolol (TENORMIN) 25 MG tablet Take 1 tablet by mouth daily      candesartan (ATACAND) 32 MG tablet Take 1 tablet by mouth daily      ferrous sulfate (IRON 325) 325 (65 Fe) MG tablet Take 1 tablet by mouth daily (with breakfast) (Patient not

## 2024-03-12 NOTE — PROGRESS NOTES
3/12/24 - .Notified patient surgery @ [facility] on  3/13/24 @ 1415, arrival 1245. NPO status and morning medications reviewed. Understanding verbalized.

## 2024-03-13 ENCOUNTER — HOSPITAL ENCOUNTER (OUTPATIENT)
Age: 84
Setting detail: OUTPATIENT SURGERY
Discharge: HOME | End: 2024-03-13
Attending: SPECIALIST | Admitting: SPECIALIST
Payer: MEDICARE

## 2024-03-13 ENCOUNTER — ANESTHESIA (OUTPATIENT)
Dept: ENDOSCOPY | Age: 84
End: 2024-03-13
Payer: MEDICARE

## 2024-03-13 VITALS
SYSTOLIC BLOOD PRESSURE: 135 MMHG | BODY MASS INDEX: 20.62 KG/M2 | TEMPERATURE: 97.6 F | WEIGHT: 105 LBS | HEART RATE: 61 BPM | HEIGHT: 60 IN | DIASTOLIC BLOOD PRESSURE: 52 MMHG | OXYGEN SATURATION: 94 % | RESPIRATION RATE: 18 BRPM

## 2024-03-13 DIAGNOSIS — D64.9 ANEMIA, SECONDARY: ICD-10-CM

## 2024-03-13 PROBLEM — D12.0 BENIGN NEOPLASM OF CECUM: Status: ACTIVE | Noted: 2024-03-13

## 2024-03-13 PROBLEM — D12.2 BENIGN NEOPLASM OF ASCENDING COLON: Status: ACTIVE | Noted: 2024-03-13

## 2024-03-13 PROCEDURE — 2709999900 HC NON-CHARGEABLE SUPPLY: Performed by: SPECIALIST

## 2024-03-13 PROCEDURE — 2580000003 HC RX 258: Performed by: ANESTHESIOLOGY

## 2024-03-13 PROCEDURE — 7100000011 HC PHASE II RECOVERY - ADDTL 15 MIN: Performed by: SPECIALIST

## 2024-03-13 PROCEDURE — 6360000002 HC RX W HCPCS: Performed by: NURSE ANESTHETIST, CERTIFIED REGISTERED

## 2024-03-13 PROCEDURE — 88305 TISSUE EXAM BY PATHOLOGIST: CPT

## 2024-03-13 PROCEDURE — 3609010600 HC COLONOSCOPY POLYPECTOMY SNARE/COLD BIOPSY: Performed by: SPECIALIST

## 2024-03-13 PROCEDURE — 7100000010 HC PHASE II RECOVERY - FIRST 15 MIN: Performed by: SPECIALIST

## 2024-03-13 PROCEDURE — 3700000000 HC ANESTHESIA ATTENDED CARE: Performed by: SPECIALIST

## 2024-03-13 PROCEDURE — C1889 IMPLANT/INSERT DEVICE, NOC: HCPCS | Performed by: SPECIALIST

## 2024-03-13 PROCEDURE — 2500000003 HC RX 250 WO HCPCS: Performed by: NURSE ANESTHETIST, CERTIFIED REGISTERED

## 2024-03-13 PROCEDURE — 3700000001 HC ADD 15 MINUTES (ANESTHESIA): Performed by: SPECIALIST

## 2024-03-13 DEVICE — CLIP LIG L235CM RESOL 360 BX/20: Type: IMPLANTABLE DEVICE | Status: FUNCTIONAL

## 2024-03-13 RX ORDER — SODIUM CHLORIDE, SODIUM LACTATE, POTASSIUM CHLORIDE, CALCIUM CHLORIDE 600; 310; 30; 20 MG/100ML; MG/100ML; MG/100ML; MG/100ML
INJECTION, SOLUTION INTRAVENOUS CONTINUOUS
Status: DISCONTINUED | OUTPATIENT
Start: 2024-03-13 | End: 2024-03-13 | Stop reason: HOSPADM

## 2024-03-13 RX ORDER — LIDOCAINE HYDROCHLORIDE 20 MG/ML
INJECTION, SOLUTION INFILTRATION; PERINEURAL PRN
Status: DISCONTINUED | OUTPATIENT
Start: 2024-03-13 | End: 2024-03-13 | Stop reason: SDUPTHER

## 2024-03-13 RX ORDER — PROPOFOL 10 MG/ML
INJECTION, EMULSION INTRAVENOUS PRN
Status: DISCONTINUED | OUTPATIENT
Start: 2024-03-13 | End: 2024-03-13 | Stop reason: SDUPTHER

## 2024-03-13 RX ADMIN — SODIUM CHLORIDE, POTASSIUM CHLORIDE, SODIUM LACTATE AND CALCIUM CHLORIDE: 600; 310; 30; 20 INJECTION, SOLUTION INTRAVENOUS at 13:07

## 2024-03-13 RX ADMIN — PROPOFOL 270 MG: 10 INJECTION, EMULSION INTRAVENOUS at 14:28

## 2024-03-13 RX ADMIN — LIDOCAINE HYDROCHLORIDE 40 MG: 20 INJECTION, SOLUTION INFILTRATION; PERINEURAL at 14:28

## 2024-03-13 ASSESSMENT — PAIN SCALES - GENERAL
PAINLEVEL_OUTOF10: 0

## 2024-03-13 ASSESSMENT — LIFESTYLE VARIABLES: SMOKING_STATUS: 0

## 2024-03-13 NOTE — BRIEF OP NOTE
BRIEF COLONOSCOPY REPORT:   The original colonoscopy report with photos in higher definition can be found by going to \"notes\" then \"procedures\" then double click on \"colonoscopy\"    Impression:         -  One 3 mm polyp in the cecum, removed with a cold snare.  Resected and            retrieved.         -  One 12 mm polyp in the proximal ascending colon, removed piecemeal using a            cold biopsy forceps.  Resected and retrieved.  Clips were placed.  Clip            : [email or web address]         -  Moderate diverticulosis in the descending colon and in the sigmoid colon.         -  Internal hemorrhoids.         -  The examination was otherwise normal on direct and retroflexion views.    Recommendation:         -  Repeat colonoscopy in 1 year for surveillance of polyps greater than 1 cm in size.

## 2024-03-13 NOTE — ANESTHESIA PRE PROCEDURE
Department of Anesthesiology  Preprocedure Note       Name:  Noemi Freeman   Age:  83 y.o.  :  1940                                          MRN:  [id number]         Date:  3/13/2024      Surgeon: Surgeon(s):  Ignacio Elizabeth MD    Procedure: Procedure(s):  COLONOSCOPY DIAGNOSTIC    Medications prior to admission:   Prior to Admission medications    Medication Sig Start Date End Date Taking? Authorizing Provider   psyllium (KONSYL) 28.3 % PACK Take 1 packet by mouth daily   Yes Chaitanya Frausto MD   amLODIPine (NORVASC) 5 MG tablet Take 1 tablet by mouth daily    Chaitanya Frausto MD   atenolol (TENORMIN) 25 MG tablet Take 1 tablet by mouth daily    Chaitanya Frausto MD   candesartan (ATACAND) 32 MG tablet Take 1 tablet by mouth daily    Chaitanya Frausto MD   ferrous sulfate (IRON 325) 325 (65 Fe) MG tablet Take 1 tablet by mouth daily (with breakfast)  Patient not taking: Reported on 3/6/2024 11/10/23   Andrew Schulz MD   Probiotic Product (PROBIOTIC DAILY PO) Take  by mouth daily.    Chaitanya Frausto MD   esomeprazole (NEXIUM) 40 MG delayed release capsule Take 1 capsule by mouth daily    Chaitanya Frausto MD   Calcium Carb-Cholecalciferol (CALCIUM 1000 + D PO) Take 1,000 mg by mouth daily.      Chaitanya Frausto MD   therapeutic multivitamin-minerals (THERAGRAN-M) tablet Take 1 tablet by mouth daily    Chaitanya Frausto MD       Current medications:    Current Facility-Administered Medications   Medication Dose Route Frequency Provider Last Rate Last Admin    lactated ringers IV soln infusion   IntraVENous Continuous Gera Natarajan MD 50 mL/hr at 24 1307 New Bag at 24 1307       Allergies:    Allergies   Allergen Reactions    Hydrochlorothiazide     Triamterene     Vioxx [Rofecoxib]        Problem List:    Patient Active Problem List   Diagnosis Code    Hypertension I10    LBBB (left bundle branch block) I44.7    Hernia, hiatal K44.9    GERD

## 2024-03-13 NOTE — PROGRESS NOTES
1518 - patient received from endo, report received from Nanci HERNADEZ, Patient A&O, denies pain or nausea, abd non tender to touch, soft, family at bedside, call light with in reach. Given water and crackers.  1534 - VSS, patient denies pain or nausea, ab soft, non tender  1540 - iv removed  1545 - patient dressing  1555  - discharge instructions given to patient and friend, understanding voiced, without any further questions at this time  1605 - patient left sds via wheelchair accompanied by nurse, patient safely in car

## 2024-03-13 NOTE — ANESTHESIA POSTPROCEDURE EVALUATION
Department of Anesthesiology  Postprocedure Note    Patient: Noemi Freeman  MRN: [id number]  YOB: 1940  Date of evaluation: 3/13/2024    Procedure Summary       Date: 03/13/24 Room / Location: [address]    Anesthesia Start: 1424 Anesthesia Stop: 1508    Procedure: COLONOSCOPY POLYPECTOMY SNARE/COLD BIOPSY WITH PLACEMENT 6 HEMOCLIPS AT THE LOWER ASCENDING POLYPECTOMY SITE Diagnosis:       Anemia, secondary      (Anemia, secondary [D64.9])    Surgeons: Ignacio Elizabeth MD Responsible Provider: Alison Becerra APRN - CRNA    Anesthesia Type: MAC ASA Status: 3            Anesthesia Type: No value filed.    Neisha Phase I:      Neisha Phase II:      Anesthesia Post Evaluation    Patient location during evaluation: floor  Patient participation: complete - patient participated  Level of consciousness: awake and alert  Pain score: 0  Airway patency: patent  Nausea & Vomiting: no nausea and no vomiting  Cardiovascular status: hemodynamically stable  Respiratory status: room air  Hydration status: euvolemic  Pain management: adequate    No notable events documented.

## 2024-03-13 NOTE — DISCHARGE INSTRUCTIONS
[facility]  [phone]    Do not drive, work around machines or use equipment.  Do not drink any alcoholic beverages.  Do not smoke while alone.  Avoid making important decisions.  Plan to spend a quiet, relaxed evening @ home.  Resume normal activities as you begin to feel better.  Eat lightly for your first meal, then gradually increase your diet to what is normal for you.  In case of nausea, avoid food and drink only clear liquids.  Resume food as nausea ceases.  Notify your surgeon if you experience fever, chills, large amount of bleeding, difficulty breathing, persistent nausea and vomiting or any other disturbing problem.  Call for a follow-up appointment with your surgeon. COLONOSCOPY    DR. Elizabeth    OFFICE NUMBER:  [phone]    FOLLOW UP APPOINTMENT:  Call for pathology results in one week    REPEAT PROCEDURE IN 1 year    TEST ORDERED: NONE     What to expect at home:  Your Recovery   Your doctor will tell you when you can eat and do your other usual activities Your doctor will talk to you about when you will need your next colonoscopy. Your doctor can help you decide how often you need to be checked. This will depend on the results of your test and your risk for colorectal cancer.  After the test, you may be bloated or have gas pains. You may need to pass gas. If a biopsy was done or a polyp was removed, you may have streaks of blood in your stool (feces) for a few days.  This care sheet gives you a general idea about how long it will take for you to recover. But each person recovers at a different pace. Follow the steps below to get better as quickly as possible.  How can you care for yourself at home?  Activity  Rest when you feel tired.  Diet  Follow your doctor's directions for eating.  Unless your doctor has told you not to, drink plenty of fluids. This helps to replace the fluids that were lost during the colon prep.  DO NOT DRINK ALCOHOL.  Medicines  Your doctor 
none

## 2024-03-22 ENCOUNTER — TELEPHONE (OUTPATIENT)
Dept: GASTROENTEROLOGY | Age: 84
End: 2024-03-22

## 2024-04-10 NOTE — ANESTHESIA PRE PROCEDURE
Department of Anesthesiology  Preprocedure Note       Name:  Noemi Freeman   Age:  83 y.o.  :  1940                                          MRN:  [id number]         Date:  4/10/2024      Surgeon: Surgeon(s):  Ignacio Elizabeth MD    Procedure: Procedure(s):  COLONOSCOPY POLYPECTOMY SNARE/COLD BIOPSY WITH PLACEMENT 6 HEMOCLIPS AT THE LOWER ASCENDING POLYPECTOMY SITE    Medications prior to admission:   Prior to Admission medications    Medication Sig Start Date End Date Taking? Authorizing Provider   psyllium (KONSYL) 28.3 % PACK Take 1 packet by mouth daily   Yes Chaitanya Frausto MD   amLODIPine (NORVASC) 5 MG tablet Take 1 tablet by mouth daily    Chaitanya Frausto MD   atenolol (TENORMIN) 25 MG tablet Take 1 tablet by mouth daily    Chaitanya Frausto MD   candesartan (ATACAND) 32 MG tablet Take 1 tablet by mouth daily    Chaitanya Frausto MD   ferrous sulfate (IRON 325) 325 (65 Fe) MG tablet Take 1 tablet by mouth daily (with breakfast)  Patient not taking: Reported on 3/6/2024 11/10/23   Andrew Schulz MD   Probiotic Product (PROBIOTIC DAILY PO) Take  by mouth daily.    Chaitanya Frausto MD   esomeprazole (NEXIUM) 40 MG delayed release capsule Take 1 capsule by mouth daily    Chaitanya Frausto MD   Calcium Carb-Cholecalciferol (CALCIUM 1000 + D PO) Take 1,000 mg by mouth daily.      Chaitanya Frausto MD   therapeutic multivitamin-minerals (THERAGRAN-M) tablet Take 1 tablet by mouth daily    Chaitanya Frausto MD       Current medications:    No current facility-administered medications for this encounter.     Current Outpatient Medications   Medication Sig Dispense Refill   • psyllium (KONSYL) 28.3 % PACK Take 1 packet by mouth daily     • amLODIPine (NORVASC) 5 MG tablet Take 1 tablet by mouth daily     • atenolol (TENORMIN) 25 MG tablet Take 1 tablet by mouth daily     • candesartan (ATACAND) 32 MG tablet Take 1 tablet by mouth daily     • ferrous sulfate (IRON

## 2024-05-21 ENCOUNTER — PROCEDURE VISIT (OUTPATIENT)
Dept: CARDIOLOGY CLINIC | Age: 84
End: 2024-05-21

## 2024-05-21 DIAGNOSIS — I45.9 HEART BLOCK: ICD-10-CM

## 2024-05-21 DIAGNOSIS — Z95.0 CARDIAC PACEMAKER IN SITU: Primary | ICD-10-CM

## 2024-07-08 ENCOUNTER — OFFICE VISIT (OUTPATIENT)
Dept: CARDIOLOGY CLINIC | Age: 84
End: 2024-07-08
Payer: MEDICARE

## 2024-07-08 VITALS
BODY MASS INDEX: 21.79 KG/M2 | HEIGHT: 60 IN | SYSTOLIC BLOOD PRESSURE: 122 MMHG | WEIGHT: 111 LBS | HEART RATE: 59 BPM | DIASTOLIC BLOOD PRESSURE: 62 MMHG | OXYGEN SATURATION: 95 %

## 2024-07-08 DIAGNOSIS — R06.02 SOB (SHORTNESS OF BREATH): ICD-10-CM

## 2024-07-08 DIAGNOSIS — I10 PRIMARY HYPERTENSION: Primary | ICD-10-CM

## 2024-07-08 DIAGNOSIS — Z95.0 PACEMAKER: ICD-10-CM

## 2024-07-08 DIAGNOSIS — E78.5 DYSLIPIDEMIA: ICD-10-CM

## 2024-07-08 PROCEDURE — 99214 OFFICE O/P EST MOD 30 MIN: CPT | Performed by: NURSE PRACTITIONER

## 2024-07-08 PROCEDURE — G8420 CALC BMI NORM PARAMETERS: HCPCS | Performed by: NURSE PRACTITIONER

## 2024-07-08 PROCEDURE — 1123F ACP DISCUSS/DSCN MKR DOCD: CPT | Performed by: NURSE PRACTITIONER

## 2024-07-08 PROCEDURE — 3078F DIAST BP <80 MM HG: CPT | Performed by: NURSE PRACTITIONER

## 2024-07-08 PROCEDURE — 3074F SYST BP LT 130 MM HG: CPT | Performed by: NURSE PRACTITIONER

## 2024-07-08 PROCEDURE — 1090F PRES/ABSN URINE INCON ASSESS: CPT | Performed by: NURSE PRACTITIONER

## 2024-07-08 PROCEDURE — G8427 DOCREV CUR MEDS BY ELIG CLIN: HCPCS | Performed by: NURSE PRACTITIONER

## 2024-07-08 PROCEDURE — 1036F TOBACCO NON-USER: CPT | Performed by: NURSE PRACTITIONER

## 2024-07-08 PROCEDURE — G8400 PT W/DXA NO RESULTS DOC: HCPCS | Performed by: NURSE PRACTITIONER

## 2024-07-08 ASSESSMENT — ENCOUNTER SYMPTOMS: SHORTNESS OF BREATH: 1

## 2024-07-08 NOTE — PROGRESS NOTES
Various goals are discussed and questions answered. Continue current medications. Appropriate prescriptions are addressed.  Questions answered and patient verbalizes understanding.     Call for any problems, questions, or concerns.    Pt is to follow up in 6 months for Cardiac management    -Voice recognition software used for portions of written documentation and may not reflect accurate statements    Electronically signed by RACHAEL Young CNP on 7/8/2024 at 9:46 AM

## 2024-07-08 NOTE — ASSESSMENT & PLAN NOTE
-At or near goal yes      -The nature of cardiac risk has been fully discussed with this patient. I have made her aware of her LDL target goal given her cardiovascular risk analysis. I have discussed the appropriate diet. The need for lifelong compliance in order to reduce risk is stressed. A regular exercise program is recommended to help achieve and maintain normal body weight, fitness and improve lipid balance.

## 2024-07-08 NOTE — ASSESSMENT & PLAN NOTE
Sensing is normal. Impedence is normal.  Threshold is normal.  There has not been interval changes.  Estimated battery life is 5.9 years  The underlying rhythm is AP,.  56.1 % atrial paced; 100 % ventricular paced.     Atrial Arrhythmia : No   Non sustained VT episodes : No  Sustained VT episodes : No  Patient activity reported 4.5 hrs/day  The patient is pacemaker dependent.       Patient is having high burden of RV pacing and could cause pacing induced cardiomyopathy. Please discuss with primary cardiologist regarding the same. If patient symptomatic with NYHA class II and above symptoms or if there is reduction in LVEF then upgrade to BIV pacer may be considered. Thank you

## 2024-07-15 RX ORDER — AMLODIPINE BESYLATE 5 MG/1
5 TABLET ORAL DAILY
Qty: 90 TABLET | Refills: 1 | Status: SHIPPED | OUTPATIENT
Start: 2024-07-15

## 2024-08-15 ENCOUNTER — TELEPHONE (OUTPATIENT)
Dept: CARDIOLOGY CLINIC | Age: 84
End: 2024-08-15

## 2024-08-16 ENCOUNTER — TELEPHONE (OUTPATIENT)
Dept: CARDIOLOGY CLINIC | Age: 84
End: 2024-08-16

## 2024-08-28 ENCOUNTER — PROCEDURE VISIT (OUTPATIENT)
Dept: CARDIOLOGY CLINIC | Age: 84
End: 2024-08-28

## 2024-08-28 DIAGNOSIS — I45.9 HEART BLOCK: ICD-10-CM

## 2024-08-28 DIAGNOSIS — Z95.0 CARDIAC PACEMAKER IN SITU: ICD-10-CM

## 2024-10-21 RX ORDER — AMLODIPINE BESYLATE 5 MG/1
5 TABLET ORAL DAILY
Qty: 90 TABLET | Refills: 1 | Status: SHIPPED | OUTPATIENT
Start: 2024-10-21

## 2024-12-04 ENCOUNTER — TELEPHONE (OUTPATIENT)
Dept: CARDIOLOGY CLINIC | Age: 84
End: 2024-12-04

## 2024-12-28 ENCOUNTER — APPOINTMENT (OUTPATIENT)
Dept: GENERAL RADIOLOGY | Age: 84
End: 2024-12-28
Payer: MEDICARE

## 2024-12-28 ENCOUNTER — HOSPITAL ENCOUNTER (EMERGENCY)
Age: 84
Discharge: HOME | End: 2024-12-28
Attending: EMERGENCY MEDICINE
Payer: MEDICARE

## 2024-12-28 VITALS
DIASTOLIC BLOOD PRESSURE: 78 MMHG | SYSTOLIC BLOOD PRESSURE: 161 MMHG | WEIGHT: 112 LBS | RESPIRATION RATE: 18 BRPM | OXYGEN SATURATION: 95 % | HEART RATE: 88 BPM | TEMPERATURE: 98.7 F | HEIGHT: 60 IN | BODY MASS INDEX: 21.99 KG/M2

## 2024-12-28 DIAGNOSIS — R05.1 ACUTE COUGH: ICD-10-CM

## 2024-12-28 DIAGNOSIS — J01.10 ACUTE FRONTAL SINUSITIS, RECURRENCE NOT SPECIFIED: Primary | ICD-10-CM

## 2024-12-28 LAB
INFLUENZA A BY PCR: NOT DETECTED
INFLUENZA B BY PCR: NOT DETECTED
S PYO AG THROAT QL: NEGATIVE
SARS-COV-2 RDRP RESP QL NAA+PROBE: NOT DETECTED
SPECIMEN DESCRIPTION: NORMAL
SPECIMEN SOURCE: NORMAL

## 2024-12-28 PROCEDURE — 87635 SARS-COV-2 COVID-19 AMP PRB: CPT

## 2024-12-28 PROCEDURE — 87502 INFLUENZA DNA AMP PROBE: CPT

## 2024-12-28 PROCEDURE — 6370000000 HC RX 637 (ALT 250 FOR IP): Performed by: EMERGENCY MEDICINE

## 2024-12-28 PROCEDURE — 87880 STREP A ASSAY W/OPTIC: CPT

## 2024-12-28 PROCEDURE — 71046 X-RAY EXAM CHEST 2 VIEWS: CPT

## 2024-12-28 PROCEDURE — 87081 CULTURE SCREEN ONLY: CPT

## 2024-12-28 PROCEDURE — 99284 EMERGENCY DEPT VISIT MOD MDM: CPT

## 2024-12-28 RX ORDER — AZITHROMYCIN 500 MG/1
500 TABLET, FILM COATED ORAL DAILY
Qty: 3 TABLET | Refills: 0 | Status: SHIPPED | OUTPATIENT
Start: 2024-12-28 | End: 2024-12-31

## 2024-12-28 RX ORDER — ACETAMINOPHEN 500 MG
1000 TABLET ORAL ONCE
Status: COMPLETED | OUTPATIENT
Start: 2024-12-28 | End: 2024-12-28

## 2024-12-28 RX ORDER — BENZONATATE 100 MG/1
100 CAPSULE ORAL ONCE
Status: COMPLETED | OUTPATIENT
Start: 2024-12-28 | End: 2024-12-28

## 2024-12-28 RX ORDER — BENZONATATE 100 MG/1
100 CAPSULE ORAL 2 TIMES DAILY PRN
Qty: 20 CAPSULE | Refills: 0 | Status: SHIPPED | OUTPATIENT
Start: 2024-12-28 | End: 2025-01-04

## 2024-12-28 RX ADMIN — BENZONATATE 100 MG: 100 CAPSULE ORAL at 09:57

## 2024-12-28 RX ADMIN — ACETAMINOPHEN 1000 MG: 500 TABLET ORAL at 09:57

## 2024-12-28 ASSESSMENT — PAIN SCALES - GENERAL: PAINLEVEL_OUTOF10: 2

## 2024-12-28 ASSESSMENT — PAIN - FUNCTIONAL ASSESSMENT: PAIN_FUNCTIONAL_ASSESSMENT: 0-10

## 2024-12-28 ASSESSMENT — PAIN DESCRIPTION - PAIN TYPE: TYPE: ACUTE PAIN

## 2024-12-28 ASSESSMENT — PAIN DESCRIPTION - FREQUENCY: FREQUENCY: INTERMITTENT

## 2024-12-28 ASSESSMENT — PAIN DESCRIPTION - ORIENTATION: ORIENTATION: OTHER (COMMENT)

## 2024-12-28 ASSESSMENT — PAIN DESCRIPTION - LOCATION: LOCATION: CHEST

## 2024-12-28 NOTE — ED TRIAGE NOTES
Patient says she has been coughing since last night, coughing up green phlegm. Did not check temperature, no meds PTA.

## 2024-12-28 NOTE — ED PROVIDER NOTES
[facility]    Emergency Department Encounter      Patient: Noemi Freeman  MRN: [id number]  : 1940  Date of Evaluation: 2024  ED Provider:  Isabelle Aggarwal DO    Triage Chief Complaint:   Cough (Productive, green)    [place]:  Noemi Freeman is a 84 y.o. female who  has a past medical history of Arthritis, Family history of coronary artery disease, H/O echocardiogram, Hernia, hiatal, History of cardiovascular stress test, History of complete electrocardiogram, History of echocardiogram, History of Holter monitoring, History of nuclear stress test, History of nuclear stress test, Hx of echocardiogram, Hypertension, IBS (irritable bowel syndrome), and LBBB (left bundle branch block). and presents with   Started getting sick last night.  Was sneezing for the past week which she attributed to allergies.  Coughing up green phlegm starting yesterday.  Would like an antibiotic for her sinuses.  Has a pacemaker.  Was hospitalized in  for pneumonia in ICU.  Doesn't want to wait that long with this.    ROS - see HPI, below listed is current ROS at time of my eval:   systems reviewed and negative except as above.     Past Medical History:   Diagnosis Date    Arthritis     Family history of coronary artery disease     H/O echocardiogram 2015    EF 55% Normal chamber sizes Left ventricular hypertrophy with normal LV systolic, but abnormal diastolic. Mild TR and aortic insufficiencies with mild pulm HTN. Mild-mod mitral regurg.     Hernia, hiatal     History of cardiovascular stress test 2012    The post stress myocardial perfusion images show a normal pattern of perfusion in all regions. The post stress left ventricle is normal in size.Theres no scintigraphic evidence of inducible myocardial ischemia. No wall motion abnormalities seen. No previous study to compare with. The EF is 70%. Normal myocardial perfusion study.     History of complete electrocardiogram  bowel syndrome), and LBBB (left bundle branch block).  Patient’s care impacted by chronic condition: Pneumonia last year and hospitalized for ICU how it impacts care: Patient is concerned and wants to catch it early, prefers to have antibiotics    Social Determinants : None    Records Reviewed : None    Differential diagnosis associated with the patient's presentation and disposition considerations (tests considered but not done, Shared Decision Making, Pt Expectation of Test or Tx.):   Flu, COVID, sinusitis, strep pharyngitis, other viral syndrome    Appropriate for outpatient management      This patient is not complaining of chest pain or dizziness.  They are not tachycardic at the time of disposition.          Is this patient to be included in the SEP-1 Core Measure due to severe sepsis or septic shock?   No   Exclusion criteria - the patient is NOT to be included for SEP-1 Core Measure due to:  May have criteria for sepsis, but does not meet criteria for severe sepsis or septic shock    Discharge condition: stable    Discharged to follow up and return for any new or worsening symptoms.    I am the Primary Clinician of Record.      Clinical Impression:  1. Acute frontal sinusitis, recurrence not specified    2. Acute cough      Disposition referral (if applicable):  Lary Min MD  [address] [place] [id number] [phone]    Schedule an appointment as soon as possible for a visit       [facility]  [facility] [place] [address]  [phone]    If symptoms worsen    Disposition medications (if applicable):  Discharge Medication List as of 12/28/2024 11:00 AM        START taking these medications    Details   azithromycin (ZITHROMAX) 500 MG tablet Take 1 tablet by mouth daily for 3 days, Disp-3 tablet, R-0Normal      benzonatate (TESSALON) 100 MG capsule Take 1 capsule by mouth 2 times daily as needed for Cough, Disp-20 capsule, R-0Normal           ED

## 2024-12-29 LAB
MICROORGANISM SPEC CULT: NORMAL
SPECIMEN DESCRIPTION: NORMAL

## 2024-12-31 LAB
MICROORGANISM SPEC CULT: NORMAL
SPECIMEN DESCRIPTION: NORMAL

## 2025-02-10 ENCOUNTER — OFFICE VISIT (OUTPATIENT)
Dept: CARDIOLOGY CLINIC | Age: 85
End: 2025-02-10
Payer: MEDICARE

## 2025-02-10 VITALS
HEART RATE: 60 BPM | DIASTOLIC BLOOD PRESSURE: 70 MMHG | BODY MASS INDEX: 21.99 KG/M2 | WEIGHT: 112 LBS | HEIGHT: 60 IN | SYSTOLIC BLOOD PRESSURE: 124 MMHG

## 2025-02-10 DIAGNOSIS — Z00.00 EXAMINATION: Primary | ICD-10-CM

## 2025-02-10 PROCEDURE — 1123F ACP DISCUSS/DSCN MKR DOCD: CPT | Performed by: INTERNAL MEDICINE

## 2025-02-10 PROCEDURE — 3074F SYST BP LT 130 MM HG: CPT | Performed by: INTERNAL MEDICINE

## 2025-02-10 PROCEDURE — 1159F MED LIST DOCD IN RCRD: CPT | Performed by: INTERNAL MEDICINE

## 2025-02-10 PROCEDURE — 93000 ELECTROCARDIOGRAM COMPLETE: CPT | Performed by: INTERNAL MEDICINE

## 2025-02-10 PROCEDURE — G8420 CALC BMI NORM PARAMETERS: HCPCS | Performed by: INTERNAL MEDICINE

## 2025-02-10 PROCEDURE — G8427 DOCREV CUR MEDS BY ELIG CLIN: HCPCS | Performed by: INTERNAL MEDICINE

## 2025-02-10 PROCEDURE — G8400 PT W/DXA NO RESULTS DOC: HCPCS | Performed by: INTERNAL MEDICINE

## 2025-02-10 PROCEDURE — 1090F PRES/ABSN URINE INCON ASSESS: CPT | Performed by: INTERNAL MEDICINE

## 2025-02-10 PROCEDURE — 3078F DIAST BP <80 MM HG: CPT | Performed by: INTERNAL MEDICINE

## 2025-02-10 PROCEDURE — 1036F TOBACCO NON-USER: CPT | Performed by: INTERNAL MEDICINE

## 2025-02-10 PROCEDURE — 99214 OFFICE O/P EST MOD 30 MIN: CPT | Performed by: INTERNAL MEDICINE

## 2025-02-10 NOTE — PROGRESS NOTES
Satinder Hylton MD        OFFICE  FOLLOWUP NOTE    Chief complaints: patient is here for management of PPM, PERCIARDIAL EFFUSION, CHF, UTI, GERD, pneumonia     Subjective: patient feels  chest pain which gets better mylanta, no shortness of breath, no dizziness, no palpitations    HPI Noemi is a 84 y.o.year old who  has a past medical history of Arthritis, Family history of coronary artery disease, H/O echocardiogram, Hernia, hiatal, History of cardiovascular stress test, History of complete electrocardiogram, History of echocardiogram, History of Holter monitoring, History of nuclear stress test, History of nuclear stress test, Hx of echocardiogram, Hypertension, IBS (irritable bowel syndrome), and LBBB (left bundle branch block). and presents for management of CAD, DM, HTN, AFIB, which are well controlled      Current Outpatient Medications   Medication Sig Dispense Refill    amLODIPine (NORVASC) 5 MG tablet Take 1 tablet by mouth daily 90 tablet 1    psyllium (KONSYL) 28.3 % PACK Take 1 packet by mouth daily      atenolol (TENORMIN) 25 MG tablet Take 1 tablet by mouth daily      candesartan (ATACAND) 32 MG tablet Take 1 tablet by mouth daily      Probiotic Product (PROBIOTIC DAILY PO) Take  by mouth daily.      esomeprazole (NEXIUM) 40 MG delayed release capsule Take 1 capsule by mouth daily      Calcium Carb-Cholecalciferol (CALCIUM 1000 + D PO) Take 1,000 mg by mouth daily.        therapeutic multivitamin-minerals (THERAGRAN-M) tablet Take 1 tablet by mouth daily      ferrous sulfate (IRON 325) 325 (65 Fe) MG tablet Take 1 tablet by mouth daily (with breakfast) (Patient not taking: Reported on 3/6/2024) 180 tablet 1     No current facility-administered medications for this visit.     Allergies: Hydrochlorothiazide, Triamterene, and Vioxx [rofecoxib]  Past Medical History:   Diagnosis Date    Arthritis     Family history of coronary artery disease     H/O echocardiogram 04/13/2015    EF

## 2025-02-27 NOTE — H&P
Anesthesia Transfer of Care Note    Patient: Amy Hanson    Procedure(s) Performed: Procedure(s) (LRB):  ORIF, FRACTURE, METACARPAL BONE, FIFTH (Right)    Patient location: PACU    Anesthesia Type: general    Transport from OR: Transported from OR on room air with adequate spontaneous ventilation    Post pain: adequate analgesia    Post assessment: no apparent anesthetic complications    Post vital signs: stable    Level of consciousness: awake    Nausea/Vomiting: no nausea/vomiting    Complications: none    Transfer of care protocol was followed      Last vitals: Visit Vitals  BP (!) 148/96 (BP Location: Left arm, Patient Position: Lying)   Pulse 82   Temp 36.7 °C (98.1 °F) (Tympanic)   Resp 16   Wt 79.9 kg (176 lb 3.2 oz)   LMP 02/02/2025 (Exact Date)   SpO2 100%   Breastfeeding No   BMI 34.41 kg/m²      I have examined the patient within 24 hours  before the procedure and there is no change in the previous history and physical exam,which has been reviewed.There is no history of sleep apnea, snoring, or stridor.  There has been no  previous adverse experience with sedation/anesthesia. There is no increased risk for aspiration of gastric contents.  The patient has been instructed that all resuscitative measures (during the operative and immediate perioperative period) will be instituted in the unlikely event that they will be needed.The patient has no pertinent past surgical or FH other than listed in the original H&P.    ASA Class: 3  AIRWAY Class: 1    Consent form signed, witnessed and in soft chart

## 2025-03-12 ENCOUNTER — TELEPHONE (OUTPATIENT)
Dept: CARDIOLOGY CLINIC | Age: 85
End: 2025-03-12

## 2025-03-13 NOTE — TELEPHONE ENCOUNTER
Patient called she thinks she got her transmission to go through  [employer] is sending her a new unit 7 / 10 days .

## 2025-04-22 PROCEDURE — 93296 REM INTERROG EVL PM/IDS: CPT | Performed by: INTERNAL MEDICINE

## 2025-04-22 PROCEDURE — 93294 REM INTERROG EVL PM/LDLS PM: CPT | Performed by: INTERNAL MEDICINE

## 2025-04-23 RX ORDER — AMLODIPINE BESYLATE 5 MG/1
5 TABLET ORAL DAILY
Qty: 90 TABLET | Refills: 1 | Status: SHIPPED | OUTPATIENT
Start: 2025-04-23

## 2025-05-01 ENCOUNTER — OFFICE VISIT (OUTPATIENT)
Dept: GASTROENTEROLOGY | Age: 85
End: 2025-05-01
Payer: MEDICARE

## 2025-05-01 ENCOUNTER — TELEPHONE (OUTPATIENT)
Dept: GASTROENTEROLOGY | Age: 85
End: 2025-05-01

## 2025-05-01 ENCOUNTER — PREP FOR PROCEDURE (OUTPATIENT)
Dept: GASTROENTEROLOGY | Age: 85
End: 2025-05-01

## 2025-05-01 VITALS
BODY MASS INDEX: 22.1 KG/M2 | OXYGEN SATURATION: 93 % | RESPIRATION RATE: 17 BRPM | DIASTOLIC BLOOD PRESSURE: 62 MMHG | WEIGHT: 112.6 LBS | SYSTOLIC BLOOD PRESSURE: 120 MMHG | HEIGHT: 60 IN | HEART RATE: 60 BPM

## 2025-05-01 DIAGNOSIS — Z86.0101 PERSONAL HISTORY OF ADENOMATOUS AND SERRATED COLON POLYPS: ICD-10-CM

## 2025-05-01 DIAGNOSIS — Z87.19 HISTORY OF DIVERTICULOSIS: ICD-10-CM

## 2025-05-01 DIAGNOSIS — Z86.0101 PERSONAL HISTORY OF ADENOMATOUS AND SERRATED COLON POLYPS: Primary | ICD-10-CM

## 2025-05-01 PROCEDURE — 3074F SYST BP LT 130 MM HG: CPT | Performed by: INTERNAL MEDICINE

## 2025-05-01 PROCEDURE — 99213 OFFICE O/P EST LOW 20 MIN: CPT | Performed by: INTERNAL MEDICINE

## 2025-05-01 PROCEDURE — 1159F MED LIST DOCD IN RCRD: CPT | Performed by: INTERNAL MEDICINE

## 2025-05-01 PROCEDURE — 3078F DIAST BP <80 MM HG: CPT | Performed by: INTERNAL MEDICINE

## 2025-05-01 PROCEDURE — 1123F ACP DISCUSS/DSCN MKR DOCD: CPT | Performed by: INTERNAL MEDICINE

## 2025-05-01 RX ORDER — SODIUM CHLORIDE 0.9 % (FLUSH) 0.9 %
5-40 SYRINGE (ML) INJECTION EVERY 12 HOURS SCHEDULED
Status: CANCELLED | OUTPATIENT
Start: 2025-05-01

## 2025-05-01 RX ORDER — SODIUM CHLORIDE 9 MG/ML
INJECTION, SOLUTION INTRAVENOUS PRN
Status: CANCELLED | OUTPATIENT
Start: 2025-05-01

## 2025-05-01 RX ORDER — SODIUM CHLORIDE, SODIUM LACTATE, POTASSIUM CHLORIDE, CALCIUM CHLORIDE 600; 310; 30; 20 MG/100ML; MG/100ML; MG/100ML; MG/100ML
INJECTION, SOLUTION INTRAVENOUS CONTINUOUS
Status: CANCELLED | OUTPATIENT
Start: 2025-05-01

## 2025-05-01 RX ORDER — SODIUM CHLORIDE 0.9 % (FLUSH) 0.9 %
5-40 SYRINGE (ML) INJECTION PRN
Status: CANCELLED | OUTPATIENT
Start: 2025-05-01

## 2025-05-01 NOTE — PROGRESS NOTES
[facility]      MD Falguni Castaneda MD Carol Christensen, APRN-CNP       Charleen Hightower, APRN-CNP             [address] [place] [address]             [phone] fax [phone]        Gastroenterology Clinic Follow Up Visit    Willa Childress MD  Encounter Date: 05/01/25     CC: Follow-up (stablish care, discuss repeat c-scope/)       No referring provider defined for this encounter.     History obtained from: patient and medical records     Subjective:     Noemi Freeman is an 84 y.o.  female who presents for Follow-up (stablish care, discuss repeat c-scope/)    83-year-old pleasant woman presents for follow-up.  She is here to establish with a new GI physician and to schedule a colonoscopy for surveillance of the polyps.  She had colonoscopy by Dr. Elizabeth about a year ago and found to have a 12 mm polyp in the ascending colon and a 3 mm polyp in the cecum.  There were adenoma and serrated adenoma pathology type polyps.  She was recommended colonoscopy in 1 year by Dr. Elizabeth.  Presently, she is not having any symptoms of rectal bleeding, abdominal pain, diarrhea.  No change in bowel pattern.  No cardiovascular, pulmonary symptoms.  She has history of compensated CHF.  No recent cardiac events.  She had follow-up in cardiology few months ago and apparently as per the report no active cardiac issues.  She is on therapy for CHF and appears to be well compensated.  No history of breathlessness, chest pain on exertion.  No recent hospitalizations.     Review of Systems  Reviewed all 14 systems with patient/family member. Pertinent items in HPI.     Medications:    Current Outpatient Medications:     amLODIPine (NORVASC) 5 MG tablet, Take 1 tablet by mouth daily, Disp: 90 tablet, Rfl: 1    psyllium (KONSYL) 28.3 % PACK, Take 1 packet by mouth daily, Disp: , Rfl:     atenolol (TENORMIN)

## 2025-05-06 ENCOUNTER — TELEPHONE (OUTPATIENT)
Dept: CARDIOLOGY CLINIC | Age: 85
End: 2025-05-06

## 2025-05-06 NOTE — TELEPHONE ENCOUNTER
Pt is calling for Carmen to advise they are switching out her medtronic's machine. The denise has been running the battery down on her phone. She has questions.

## 2025-05-09 NOTE — TELEPHONE ENCOUNTER
Called and spoke with patient about Medtronic monitor. She thought that she had to have wifi to use the monitor. I advised her that the monitor gets a signal from the cell phone tower. She said that the monitor arrived today and she will take it out of the box and hook it up. Patient said that if she had any problems or concerns then she would call me back.

## 2025-05-22 NOTE — PROGRESS NOTES
Surgery @ [facility] on 5/30/25 you will be called  5/29/25 with times. WENT OVER FOLLOWING INSTRUCTIONS AND SHE WILL HAVE HER SISTER DRIVE HER THAT DAY (SISTER DRIVING LONGER DISTANCE TO [place] SO SHE ASKED NOT TO CHANGE HER TIMES. SCHEDULING MESSAGED ABOUT THAT.)    SHE HAS A PACEMAKER AND IT WAS RECENTLY CHECKED IN APRIL 2025.    NOTHING TO EAT OR DRINK AFTER MIDNIGHT DAY OF SURGERY    1. Enter thru the hospital main entrance on day of surgery, check in at the Information Desk. If you arrive prior to 6:00am, enter thru the ER entrance.    2. Follow the directions as prescribed by the doctor for your procedure and medications.         Morning of surgery take: ATENOLOL AND AMLODIPINE         Stop vitamins, supplements and NSAIDS: NOW    3. Check with your Doctor regarding stopping blood thinners and follow their instructions.    4. Do not smoke, vape or use chewing tobacco morning of surgery. Do not drink any alcoholic beverages 24 hours prior to surgery.       This includes NA Beer. No street drugs 7 days prior to surgery.    5. If you have dentures, contacts of glasses they will be removed before going to the OR; please bring a case.    6. Please bring picture ID, insurance card, paperwork from the doctor’s office (H & P, Consent, & card for implantable devices).    7. Take a shower with an antibacterial soap the night before surgery and the morning of surgery. Do not put anything on your skin      After your morning shower.    8. You will need a responsible adult to drive you home and check on you after surgery.

## 2025-05-28 ENCOUNTER — ANESTHESIA EVENT (OUTPATIENT)
Dept: ENDOSCOPY | Age: 85
End: 2025-05-28
Payer: MEDICARE

## 2025-05-29 NOTE — PROGRESS NOTES
CALLED WITH PROCEDURE TIMES FOR PROCEDURE TOMORROW 5/30/25 AT [facility]- ARRIVAL AT 1000 FOR PROCEDURE AT 1130. SHE STATED SHE WILL HAVE HER PREP COMPLETED EARLY SO SHE WILL BE FINE.

## 2025-05-29 NOTE — ANESTHESIA PRE PROCEDURE
Department of Anesthesiology  Preprocedure Note       Name:  Noemi Freeman   Age:  84 y.o.  :  1940                                          MRN:  [id number]         Date:  2025      Surgeon: Surgeon(s):  Willa Childress MD    Procedure: Procedure(s):  COLONOSCOPY DIAGNOSTIC    Medications prior to admission:   Prior to Admission medications    Medication Sig Start Date End Date Taking? Authorizing Provider   amLODIPine (NORVASC) 5 MG tablet Take 1 tablet by mouth daily 25   Shahriar Ball, APRN - CNP   psyllium (KONSYL) 28.3 % PACK Take 1 packet by mouth daily    Chaitanya Frausto MD   atenolol (TENORMIN) 25 MG tablet Take 1 tablet by mouth daily    Chaitanya Frausto MD   candesartan (ATACAND) 32 MG tablet Take 1 tablet by mouth daily    Chaitanya Frausto MD   ferrous sulfate (IRON 325) 325 (65 Fe) MG tablet Take 1 tablet by mouth daily (with breakfast)  Patient not taking: Reported on 3/6/2024 11/10/23   Andrew Schulz MD   Probiotic Product (PROBIOTIC DAILY PO) Take  by mouth daily.    ProviderChaitanya MD   esomeprazole (NEXIUM) 40 MG delayed release capsule Take 1 capsule by mouth daily    Chaitanya Frausto MD   Calcium Carb-Cholecalciferol (CALCIUM 1000 + D PO) Take 1,000 mg by mouth daily.      ProviderChaitanya MD   therapeutic multivitamin-minerals (THERAGRAN-M) tablet Take 1 tablet by mouth daily    ProviderChaitanya MD       Current medications:    No current facility-administered medications for this encounter.     Current Outpatient Medications   Medication Sig Dispense Refill   • amLODIPine (NORVASC) 5 MG tablet Take 1 tablet by mouth daily 90 tablet 1   • psyllium (KONSYL) 28.3 % PACK Take 1 packet by mouth daily     • atenolol (TENORMIN) 25 MG tablet Take 1 tablet by mouth daily     • candesartan (ATACAND) 32 MG tablet Take 1 tablet by mouth daily     • ferrous sulfate (IRON 325) 325 (65 Fe) MG tablet Take 1 tablet by mouth daily (with

## 2025-05-30 ENCOUNTER — ANESTHESIA (OUTPATIENT)
Dept: ENDOSCOPY | Age: 85
End: 2025-05-30
Payer: MEDICARE

## 2025-05-30 ENCOUNTER — HOSPITAL ENCOUNTER (OUTPATIENT)
Age: 85
Setting detail: OUTPATIENT SURGERY
Discharge: HOME | End: 2025-05-30
Attending: INTERNAL MEDICINE | Admitting: INTERNAL MEDICINE
Payer: MEDICARE

## 2025-05-30 VITALS
TEMPERATURE: 97.8 F | BODY MASS INDEX: 20.81 KG/M2 | RESPIRATION RATE: 16 BRPM | HEART RATE: 59 BPM | OXYGEN SATURATION: 97 % | SYSTOLIC BLOOD PRESSURE: 145 MMHG | HEIGHT: 60 IN | WEIGHT: 106 LBS | DIASTOLIC BLOOD PRESSURE: 71 MMHG

## 2025-05-30 DIAGNOSIS — Z86.0101 PERSONAL HISTORY OF ADENOMATOUS AND SERRATED COLON POLYPS: ICD-10-CM

## 2025-05-30 DIAGNOSIS — Z87.19 HISTORY OF DIVERTICULOSIS: ICD-10-CM

## 2025-05-30 PROCEDURE — NBSRV NON-BILLABLE SERVICE: Performed by: INTERNAL MEDICINE

## 2025-05-30 PROCEDURE — 3609010200 HC COLONOSCOPY ABLATION TUMOR POLYP/OTHER LES: Performed by: INTERNAL MEDICINE

## 2025-05-30 PROCEDURE — 6360000002 HC RX W HCPCS: Performed by: NURSE ANESTHETIST, CERTIFIED REGISTERED

## 2025-05-30 PROCEDURE — 3700000001 HC ADD 15 MINUTES (ANESTHESIA): Performed by: INTERNAL MEDICINE

## 2025-05-30 PROCEDURE — 7100000011 HC PHASE II RECOVERY - ADDTL 15 MIN: Performed by: INTERNAL MEDICINE

## 2025-05-30 PROCEDURE — 45380 COLONOSCOPY AND BIOPSY: CPT | Performed by: INTERNAL MEDICINE

## 2025-05-30 PROCEDURE — 2580000003 HC RX 258: Performed by: INTERNAL MEDICINE

## 2025-05-30 PROCEDURE — 2709999900 HC NON-CHARGEABLE SUPPLY: Performed by: INTERNAL MEDICINE

## 2025-05-30 PROCEDURE — 3700000000 HC ANESTHESIA ATTENDED CARE: Performed by: INTERNAL MEDICINE

## 2025-05-30 PROCEDURE — 7100000010 HC PHASE II RECOVERY - FIRST 15 MIN: Performed by: INTERNAL MEDICINE

## 2025-05-30 PROCEDURE — 2720000010 HC SURG SUPPLY STERILE: Performed by: INTERNAL MEDICINE

## 2025-05-30 PROCEDURE — 88305 TISSUE EXAM BY PATHOLOGIST: CPT

## 2025-05-30 RX ORDER — SODIUM CHLORIDE 0.9 % (FLUSH) 0.9 %
5-40 SYRINGE (ML) INJECTION PRN
Status: DISCONTINUED | OUTPATIENT
Start: 2025-05-30 | End: 2025-05-30 | Stop reason: HOSPADM

## 2025-05-30 RX ORDER — SODIUM CHLORIDE, SODIUM LACTATE, POTASSIUM CHLORIDE, CALCIUM CHLORIDE 600; 310; 30; 20 MG/100ML; MG/100ML; MG/100ML; MG/100ML
INJECTION, SOLUTION INTRAVENOUS CONTINUOUS
Status: DISCONTINUED | OUTPATIENT
Start: 2025-05-30 | End: 2025-05-30 | Stop reason: HOSPADM

## 2025-05-30 RX ORDER — SODIUM CHLORIDE 0.9 % (FLUSH) 0.9 %
5-40 SYRINGE (ML) INJECTION EVERY 12 HOURS SCHEDULED
Status: DISCONTINUED | OUTPATIENT
Start: 2025-05-30 | End: 2025-05-30 | Stop reason: HOSPADM

## 2025-05-30 RX ORDER — SODIUM CHLORIDE 9 MG/ML
INJECTION, SOLUTION INTRAVENOUS PRN
Status: DISCONTINUED | OUTPATIENT
Start: 2025-05-30 | End: 2025-05-30 | Stop reason: HOSPADM

## 2025-05-30 RX ORDER — LIDOCAINE HYDROCHLORIDE 20 MG/ML
INJECTION, SOLUTION INTRAVENOUS
Status: DISCONTINUED | OUTPATIENT
Start: 2025-05-30 | End: 2025-05-30 | Stop reason: SDUPTHER

## 2025-05-30 RX ORDER — PROPOFOL 10 MG/ML
INJECTION, EMULSION INTRAVENOUS
Status: DISCONTINUED | OUTPATIENT
Start: 2025-05-30 | End: 2025-05-30 | Stop reason: SDUPTHER

## 2025-05-30 RX ADMIN — SODIUM CHLORIDE, SODIUM LACTATE, POTASSIUM CHLORIDE, AND CALCIUM CHLORIDE: .6; .31; .03; .02 INJECTION, SOLUTION INTRAVENOUS at 10:20

## 2025-05-30 RX ADMIN — PROPOFOL 150 MG: 10 INJECTION, EMULSION INTRAVENOUS at 11:26

## 2025-05-30 RX ADMIN — LIDOCAINE HYDROCHLORIDE 100 MG: 20 INJECTION, SOLUTION INTRAVENOUS at 11:26

## 2025-05-30 ASSESSMENT — PAIN - FUNCTIONAL ASSESSMENT
PAIN_FUNCTIONAL_ASSESSMENT: 0-10

## 2025-05-30 NOTE — ANESTHESIA POSTPROCEDURE EVALUATION
Department of Anesthesiology  Postprocedure Note    Patient: Noemi Freeman  MRN: [id number]  YOB: 1940  Date of evaluation: 5/30/2025    Procedure Summary       Date: 05/30/25 Room / Location: [place] [address] / [facility]    Anesthesia Start: 1120 Anesthesia Stop: 1150    Procedure: COLONOSCOPY ENDOSCOPIC MUCOSAL RESECTION using APC right colon to ascending colon polypectomy site Diagnosis:       Personal history of adenomatous and serrated colon polyps      History of diverticulosis      (Personal history of adenomatous and serrated colon polyps [Z86.0101])      (History of diverticulosis [Z87.19])    Surgeons: Willa Childress MD Responsible Provider: Damon Beth MD    Anesthesia Type: MAC ASA Status: 3            Anesthesia Type: No value filed.    Neisha Phase I: Neisha Score: 10    Neisha Phase II:  10    Anesthesia Post Evaluation    Patient location during evaluation: bedside  Patient participation: complete - patient participated  Level of consciousness: awake and alert  Pain score: 0  Airway patency: patent  Nausea & Vomiting: no nausea and no vomiting  Cardiovascular status: hemodynamically stable  Respiratory status: acceptable, room air, spontaneous ventilation and nonlabored ventilation  Hydration status: euvolemic  Pain management: adequate        No notable events documented.

## 2025-05-30 NOTE — H&P
ENDOSCOPY   Pre-operative History and Physical    Patient: Noemi Freeman  : 1940      History Obtained From:  patient        HISTORY OF PRESENT ILLNESS:                The patient is a 84 y.o. female with significant past medical history as below who presents for colonoscopy    Indication : Personal history of colon polyps    Past Medical History:        Diagnosis Date    Arthritis     Family history of coronary artery disease     H/O echocardiogram 2015    EF 55% Normal chamber sizes Left ventricular hypertrophy with normal LV systolic, but abnormal diastolic. Mild TR and aortic insufficiencies with mild pulm HTN. Mild-mod mitral regurg.     Hernia, hiatal     History of cardiovascular stress test 2012    The post stress myocardial perfusion images show a normal pattern of perfusion in all regions. The post stress left ventricle is normal in size.Theres no scintigraphic evidence of inducible myocardial ischemia. No wall motion abnormalities seen. No previous study to compare with. The EF is 70%. Normal myocardial perfusion study.     History of complete electrocardiogram 2012    History of echocardiogram 2012    Theres mild asymmetric left ventricular hypertrophy.Left ventricular systolic function is normal. EF is >55%.Theres mild mitral regurg. noted. Theres mild tricuspid regurg. noted.Mild aortic regurg. noted.     History of Holter monitoring 2017    Sinus rhythm 48 hr holter     History of nuclear stress test 2015    EF 70%. WNL    History of nuclear stress test 2020    EF >70%, Most likely normal test, Apical thinning seen is probably physiological    Hx of echocardiogram 2021    55-60%.Mild left ventricular hypertrophy.Mild mitral, tricuspid ,pulmonic and moderate aortic regurgitation is    Hypertension     IBS (irritable bowel syndrome)     LBBB (left bundle branch block)        Past Surgical History:        Procedure Laterality Date      Hydrochlorothiazide, Triamterene, and Vioxx [rofecoxib]    Social History:   TOBACCO:   reports that she has never smoked. She has never used smokeless tobacco.  ETOH:   reports that she does not currently use alcohol.    Family History:       Problem Relation Age of Onset    Stroke Mother         CVA    Stroke Brother         CVA    Heart Attack Brother     Diabetes Daughter        REVIEW OF SYSTEMS:    Negative except for HPI        PHYSICAL EXAM:      Vitals:    BP (!) 165/64   Pulse 70   Temp 97.6 °F (36.4 °C)   Resp 16   Ht 1.524 m (5')   Wt 48.1 kg (106 lb)   SpO2 96%   BMI 20.70 kg/m²     General Appearance:    Alert, cooperative, no distress, appears stated age   HEENT:    NO anemia, No jaundice , no Cyanosis, Supple neck   Neck:   Supple, symmetrical, trachea midline, no adenopathy;     thyroid:    Lungs:     Clear to auscultation bilaterally, respirations unlabored   Chest Wall:    No tenderness or deformity    Heart:    Regular rate and rhythm, S1 and S2 normal, no murmur, rub   or gallop   Abdomen:     Soft, non-tender, bowel sounds active all four quadrants,     no masses, no organomegaly, no ascitis   Rectal:    Planned at time of C scope   Extremities:   Extremities normal, atraumatic, no cyanosis or edema   Pulses:   2+ and symmetric all extremities   Skin:   Skin color, texture, turgor normal, no rashes or lesions   Lymph nodes:   No abnormality   Neurologic:   No focal deficits, moving all four extremities      ASA Grade:  ASA 3 - Patient with moderate systemic disease with functional limitations  [address]: As per Pre-procedure Anesthesia note.       ASSESSMENT AND PLAN:    1.  Patient is a 84 y.o. female here for Colonoscopy. Colonoscopy with Anesthesia.   2.  Procedure options, risks and benefits reviewed with patient.  Patient expresses understanding.    Willa Childress MD  5/30/2025  11:19 AM

## 2025-05-30 NOTE — PROGRESS NOTES
1240 VS remain stable. Skin W&D. State is ready to go home. Denies any pain or nausea.   1245 Pt and her sister instructed for discharge care and follow-up with understanding voiced. IV dc'd for pt to get dressed.   1255 Pt up to wheelchair, then to bathroom and out to car at exit.

## 2025-05-30 NOTE — PROGRESS NOTES
Discharge instructions provided per JORGE Quinteros RN. Out to car per wheelchair. Home with family.

## 2025-05-30 NOTE — DISCHARGE INSTRUCTIONS
COLONOSCOPY    DR. Childress     OFFICE NUMBER [phone]    FOLLOW UP APPOINTMENT as needed    REPEAT PROCEDURE not recommended     TEST ORDERED: NONE     What to expect at home:  Your Recovery   Your doctor will tell you when you can eat and do your other usual activities Your doctor will talk to you about when you will need your next colonoscopy. Your doctor can help you decide how often you need to be checked. This will depend on the results of your test and your risk for colorectal cancer.  After the test, you may be bloated or have gas pains. You may need to pass gas. If a biopsy was done or a polyp was removed, you may have streaks of blood in your stool (feces) for a few days.  This care sheet gives you a general idea about how long it will take for you to recover. But each person recovers at a different pace. Follow the steps below to get better as quickly as possible.  How can you care for yourself at home?  Activity  Rest when you feel tired.  Diet  Follow your doctor's directions for eating.  Unless your doctor has told you not to, drink plenty of fluids. This helps to replace the fluids that were lost during the colon prep.  DO NOT DRINK ALCOHOL.  Medicines  Your doctor will tell you if and when you can restart your medicines. He or she will also give you instructions about taking any new medicines.  If you take blood thinners, such as warfarin (Coumadin), clopidogrel (Plavix), or aspirin, be sure to talk to your doctor. He or she will tell you if and when to start taking those medicines again. Make sure that you understand exactly what your doctor wants you to do.  If polyps were removed or a biopsy was done during the test, your doctor may tell you not to take aspirin or other anti-inflammatory medicines for a few days. These include ibuprofen (Advil, Motrin) and naproxen (Aleve).  Other instructions: Anethesia  For your safety, do not drive or operate machinery for 24 hours.  Do not sign legal

## 2025-06-02 LAB
ALBUMIN: 4.5 G/DL
ALP BLD-CCNC: 53 U/L
ALT SERPL-CCNC: 10 U/L
ANION GAP SERPL CALCULATED.3IONS-SCNC: NORMAL MMOL/L
AST SERPL-CCNC: 14 U/L
BASOPHILS ABSOLUTE: 0 /ΜL
BASOPHILS RELATIVE PERCENT: 0.5 %
BILIRUB SERPL-MCNC: 0.3 MG/DL (ref 0.1–1.4)
BUN BLDV-MCNC: 10 MG/DL
CALCIUM SERPL-MCNC: 10 MG/DL
CHLORIDE BLD-SCNC: 103 MMOL/L
CHOLESTEROL, TOTAL: 207 MG/DL
CHOLESTEROL/HDL RATIO: NORMAL
CO2: 25 MMOL/L
CREAT SERPL-MCNC: 0.8 MG/DL
EOSINOPHILS ABSOLUTE: 0.2 /ΜL
EOSINOPHILS RELATIVE PERCENT: 3.2 %
GFR, ESTIMATED: 72
GLUCOSE BLD-MCNC: 93 MG/DL
HCT VFR BLD CALC: 39 % (ref 36–46)
HDLC SERPL-MCNC: 57 MG/DL (ref 35–70)
HEMOGLOBIN: 12.8 G/DL (ref 12–16)
LDL CHOLESTEROL: 126
LYMPHOCYTES ABSOLUTE: 2.2 /ΜL
LYMPHOCYTES RELATIVE PERCENT: 33.5 %
MCH RBC QN AUTO: 31.4 PG
MCHC RBC AUTO-ENTMCNC: 32.8 G/DL
MCV RBC AUTO: 95.6 FL
MONOCYTES ABSOLUTE: 0.6 /ΜL
MONOCYTES RELATIVE PERCENT: 8.6 %
NEUTROPHILS ABSOLUTE: 3.6 /ΜL
NEUTROPHILS RELATIVE PERCENT: 54 %
NONHDLC SERPL-MCNC: NORMAL MG/DL
PLATELET # BLD: 295 K/ΜL
PMV BLD AUTO: 10.4 FL
POTASSIUM SERPL-SCNC: 4.5 MMOL/L
RBC # BLD: 4.09 10^6/ΜL
SODIUM BLD-SCNC: 141 MMOL/L
SURGICAL PATHOLOGY REPORT: NORMAL
TOTAL PROTEIN: 7.4 G/DL (ref 6.4–8.2)
TRIGL SERPL-MCNC: 121 MG/DL
VITAMIN D 25-HYDROXY: 52
VITAMIN D2, 25 HYDROXY: NORMAL
VITAMIN D3,25 HYDROXY: NORMAL
VLDLC SERPL CALC-MCNC: 24 MG/DL
WBC # BLD: 6.6 10^3/ML

## 2025-06-03 ENCOUNTER — TELEPHONE (OUTPATIENT)
Dept: GASTROENTEROLOGY | Age: 85
End: 2025-06-03

## 2025-06-03 NOTE — TELEPHONE ENCOUNTER
Patient called and said that she had a colonoscopy with Dr Childress on 5/30 and she is still feeling like she is going to throw up and can not eat like she normally would and wants to know if she could get some nausea medication.  Thank you

## 2025-06-04 ENCOUNTER — TELEPHONE (OUTPATIENT)
Dept: GASTROENTEROLOGY | Age: 85
End: 2025-06-04

## 2025-06-04 NOTE — TELEPHONE ENCOUNTER
The patient was called and she recalled after the colonoscopy started having nausea with no vomiting;  every day and has slept all day Saturday and Sunday feeling better and eating small amounts; Monday and yesterday.  Today the nausea has resolved.  She has been drinking plenty of water and yesterday had gatorade with improvement.  Today feeling better and eating regular diet; instructed to call back if any further problems or concerns and she verbalized understanding.

## 2025-06-05 NOTE — TELEPHONE ENCOUNTER
Called her. She is feeling better. No nausea now. Polyp histopathology and colonoscopy results discussed. No more colonoscopy screening.

## 2025-06-06 ENCOUNTER — TELEPHONE (OUTPATIENT)
Dept: CARDIOLOGY CLINIC | Age: 85
End: 2025-06-06

## 2025-06-06 NOTE — TELEPHONE ENCOUNTER
Pt received an email letting her know if appears her monitor is not connected. She wanted to know if you are getting readings and show it is connected.

## 2025-06-06 NOTE — TELEPHONE ENCOUNTER
Returned call to patient. She said that she received the email on 5/23. I advised her that it is not showing in the network that the monitor and device are not communicating. Advised her to just do a transmission to be sure. She agreed.

## 2025-06-09 ENCOUNTER — RESULTS FOLLOW-UP (OUTPATIENT)
Dept: GASTROENTEROLOGY | Age: 85
End: 2025-06-09

## 2025-08-13 ENCOUNTER — OFFICE VISIT (OUTPATIENT)
Dept: GASTROENTEROLOGY | Age: 85
End: 2025-08-13
Payer: MEDICARE

## 2025-08-13 VITALS
OXYGEN SATURATION: 94 % | RESPIRATION RATE: 20 BRPM | SYSTOLIC BLOOD PRESSURE: 134 MMHG | WEIGHT: 109.2 LBS | HEART RATE: 60 BPM | HEIGHT: 60 IN | BODY MASS INDEX: 21.44 KG/M2 | DIASTOLIC BLOOD PRESSURE: 70 MMHG

## 2025-08-13 DIAGNOSIS — Z87.19 HISTORY OF DIVERTICULOSIS: ICD-10-CM

## 2025-08-13 DIAGNOSIS — K58.9 IRRITABLE BOWEL SYNDROME, UNSPECIFIED TYPE: ICD-10-CM

## 2025-08-13 DIAGNOSIS — R14.0 ABDOMINAL BLOATING: ICD-10-CM

## 2025-08-13 DIAGNOSIS — K21.9 GASTROESOPHAGEAL REFLUX DISEASE, UNSPECIFIED WHETHER ESOPHAGITIS PRESENT: ICD-10-CM

## 2025-08-13 DIAGNOSIS — Z86.0101 PERSONAL HISTORY OF ADENOMATOUS AND SERRATED COLON POLYPS: Primary | ICD-10-CM

## 2025-08-13 PROCEDURE — 1036F TOBACCO NON-USER: CPT | Performed by: INTERNAL MEDICINE

## 2025-08-13 PROCEDURE — 1123F ACP DISCUSS/DSCN MKR DOCD: CPT | Performed by: INTERNAL MEDICINE

## 2025-08-13 PROCEDURE — 1090F PRES/ABSN URINE INCON ASSESS: CPT | Performed by: INTERNAL MEDICINE

## 2025-08-13 PROCEDURE — 1159F MED LIST DOCD IN RCRD: CPT | Performed by: INTERNAL MEDICINE

## 2025-08-13 PROCEDURE — G8420 CALC BMI NORM PARAMETERS: HCPCS | Performed by: INTERNAL MEDICINE

## 2025-08-13 PROCEDURE — 99214 OFFICE O/P EST MOD 30 MIN: CPT | Performed by: INTERNAL MEDICINE

## 2025-08-13 PROCEDURE — G8427 DOCREV CUR MEDS BY ELIG CLIN: HCPCS | Performed by: INTERNAL MEDICINE

## 2025-08-18 ENCOUNTER — OFFICE VISIT (OUTPATIENT)
Dept: CARDIOLOGY CLINIC | Age: 85
End: 2025-08-18
Payer: MEDICARE

## 2025-08-18 VITALS
HEART RATE: 58 BPM | SYSTOLIC BLOOD PRESSURE: 136 MMHG | DIASTOLIC BLOOD PRESSURE: 74 MMHG | WEIGHT: 110.6 LBS | OXYGEN SATURATION: 95 % | HEIGHT: 60 IN | BODY MASS INDEX: 21.71 KG/M2

## 2025-08-18 DIAGNOSIS — E78.5 DYSLIPIDEMIA: Primary | ICD-10-CM

## 2025-08-18 DIAGNOSIS — R06.02 SOB (SHORTNESS OF BREATH): ICD-10-CM

## 2025-08-18 DIAGNOSIS — I10 PRIMARY HYPERTENSION: ICD-10-CM

## 2025-08-18 DIAGNOSIS — Z95.0 PACEMAKER: ICD-10-CM

## 2025-08-18 PROCEDURE — G8420 CALC BMI NORM PARAMETERS: HCPCS | Performed by: NURSE PRACTITIONER

## 2025-08-18 PROCEDURE — 1123F ACP DISCUSS/DSCN MKR DOCD: CPT | Performed by: NURSE PRACTITIONER

## 2025-08-18 PROCEDURE — G8427 DOCREV CUR MEDS BY ELIG CLIN: HCPCS | Performed by: NURSE PRACTITIONER

## 2025-08-18 PROCEDURE — 1159F MED LIST DOCD IN RCRD: CPT | Performed by: NURSE PRACTITIONER

## 2025-08-18 PROCEDURE — 1036F TOBACCO NON-USER: CPT | Performed by: NURSE PRACTITIONER

## 2025-08-18 PROCEDURE — 99214 OFFICE O/P EST MOD 30 MIN: CPT | Performed by: NURSE PRACTITIONER

## 2025-08-18 PROCEDURE — 1090F PRES/ABSN URINE INCON ASSESS: CPT | Performed by: NURSE PRACTITIONER

## 2025-08-18 RX ORDER — FAMOTIDINE 20 MG/1
20 TABLET, FILM COATED ORAL PRN
COMMUNITY

## 2025-08-18 ASSESSMENT — ENCOUNTER SYMPTOMS: SHORTNESS OF BREATH: 1

## (undated) DEVICE — FIAPC® PROBE W/ FILTER 2200 C OD 2.3MM/6.9FR; L 2.2M/7.2FT: Brand: ERBE

## (undated) DEVICE — ENDOSCOPY KIT: Brand: MEDLINE INDUSTRIES, INC.

## (undated) DEVICE — SNARE VASC L240CM LOOP W10MM SHTH DIA2.4MM RND STIFF CLD

## (undated) DEVICE — FORCEPS BX L240CM JAW DIA2.8MM L CAP W/ NDL MIC MESH TOOTH

## (undated) DEVICE — FORCEPS BX 240CM JAW 3.2MM L CAP NDL MIC MESH TTH M00513372

## (undated) DEVICE — ENDOSCOPIC KIT 1.1+ OP4 CA DE 2 GWN AAMI LEVEL 3